# Patient Record
Sex: FEMALE | Race: WHITE | NOT HISPANIC OR LATINO | Employment: OTHER | URBAN - METROPOLITAN AREA
[De-identification: names, ages, dates, MRNs, and addresses within clinical notes are randomized per-mention and may not be internally consistent; named-entity substitution may affect disease eponyms.]

---

## 2017-01-01 ENCOUNTER — APPOINTMENT (EMERGENCY)
Dept: RADIOLOGY | Facility: HOSPITAL | Age: 82
End: 2017-01-01
Payer: MEDICARE

## 2017-01-01 ENCOUNTER — HOSPITAL ENCOUNTER (EMERGENCY)
Facility: HOSPITAL | Age: 82
Discharge: NURSING, REHAB OR LONG-TERM CARE | End: 2017-01-01
Attending: EMERGENCY MEDICINE | Admitting: EMERGENCY MEDICINE
Payer: MEDICARE

## 2017-01-01 VITALS
SYSTOLIC BLOOD PRESSURE: 179 MMHG | TEMPERATURE: 98.2 F | DIASTOLIC BLOOD PRESSURE: 74 MMHG | OXYGEN SATURATION: 98 % | HEART RATE: 53 BPM | RESPIRATION RATE: 16 BRPM

## 2017-01-01 DIAGNOSIS — T14.8XXA SKIN ABRASION: ICD-10-CM

## 2017-01-01 DIAGNOSIS — S80.12XA CONTUSION OF LEFT LEG, INITIAL ENCOUNTER: Primary | ICD-10-CM

## 2017-01-01 PROCEDURE — 90471 IMMUNIZATION ADMIN: CPT

## 2017-01-01 PROCEDURE — 99284 EMERGENCY DEPT VISIT MOD MDM: CPT

## 2017-01-01 PROCEDURE — 73590 X-RAY EXAM OF LOWER LEG: CPT

## 2017-01-01 PROCEDURE — 90715 TDAP VACCINE 7 YRS/> IM: CPT | Performed by: EMERGENCY MEDICINE

## 2017-01-01 RX ORDER — FERROUS SULFATE 325(65) MG
325 TABLET ORAL
COMMUNITY

## 2017-01-01 RX ORDER — ESOMEPRAZOLE MAGNESIUM 40 MG/1
40 CAPSULE, DELAYED RELEASE ORAL
COMMUNITY

## 2017-01-01 RX ORDER — ACETAMINOPHEN 325 MG/1
650 TABLET ORAL EVERY 6 HOURS PRN
COMMUNITY

## 2017-01-01 RX ORDER — VALSARTAN 320 MG/1
320 TABLET ORAL DAILY
COMMUNITY

## 2017-01-01 RX ORDER — LORATADINE 10 MG/1
10 TABLET ORAL DAILY
COMMUNITY

## 2017-01-01 RX ORDER — CALCIUM CARBONATE 750 MG/1
1 TABLET, CHEWABLE ORAL 4 TIMES DAILY PRN
COMMUNITY

## 2017-01-01 RX ORDER — ATORVASTATIN CALCIUM 20 MG/1
20 TABLET, FILM COATED ORAL
COMMUNITY

## 2017-01-01 RX ORDER — METOPROLOL TARTRATE 50 MG/1
50 TABLET, FILM COATED ORAL 2 TIMES DAILY
COMMUNITY

## 2017-01-01 RX ORDER — CLOPIDOGREL BISULFATE 75 MG/1
75 TABLET ORAL DAILY
COMMUNITY

## 2017-01-01 RX ORDER — SOLIFENACIN SUCCINATE 5 MG/1
5 TABLET, FILM COATED ORAL DAILY
COMMUNITY

## 2017-01-01 RX ORDER — DIAPER,BRIEF,INFANT-TODD,DISP
1 EACH MISCELLANEOUS 2 TIMES DAILY
COMMUNITY
End: 2017-09-07

## 2017-01-01 RX ORDER — AMLODIPINE BESYLATE 2.5 MG/1
2.5 TABLET ORAL DAILY
COMMUNITY

## 2017-01-01 RX ADMIN — TETANUS TOXOID, REDUCED DIPHTHERIA TOXOID AND ACELLULAR PERTUSSIS VACCINE, ADSORBED 0.5 ML: 5; 2.5; 8; 8; 2.5 SUSPENSION INTRAMUSCULAR at 17:08

## 2017-03-22 ENCOUNTER — HOSPITAL ENCOUNTER (EMERGENCY)
Facility: HOSPITAL | Age: 82
Discharge: HOME | End: 2017-03-22
Attending: EMERGENCY MEDICINE | Admitting: EMERGENCY MEDICINE
Payer: MEDICARE

## 2017-03-22 ENCOUNTER — APPOINTMENT (EMERGENCY)
Dept: RADIOLOGY | Facility: HOSPITAL | Age: 82
End: 2017-03-22
Payer: MEDICARE

## 2017-03-22 VITALS
SYSTOLIC BLOOD PRESSURE: 153 MMHG | TEMPERATURE: 97.8 F | HEART RATE: 65 BPM | OXYGEN SATURATION: 97 % | DIASTOLIC BLOOD PRESSURE: 85 MMHG | RESPIRATION RATE: 16 BRPM
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
## 2017-03-22 DIAGNOSIS — S80.02XA CONTUSION OF LEFT KNEE, INITIAL ENCOUNTER: ICD-10-CM

## 2017-03-22 DIAGNOSIS — S80.212A ABRASION OF LEFT KNEE, INITIAL ENCOUNTER: ICD-10-CM

## 2017-03-22 PROCEDURE — 73564 X-RAY EXAM KNEE 4 OR MORE: CPT

## 2017-03-22 PROCEDURE — 99284 EMERGENCY DEPT VISIT MOD MDM: CPT

## 2017-08-26 ENCOUNTER — APPOINTMENT (EMERGENCY)
Dept: CT IMAGING | Facility: HOSPITAL | Age: 82
End: 2017-08-26
Payer: MEDICARE

## 2017-08-26 ENCOUNTER — HOSPITAL ENCOUNTER (EMERGENCY)
Facility: HOSPITAL | Age: 82
Discharge: HOME | End: 2017-08-26
Attending: EMERGENCY MEDICINE | Admitting: EMERGENCY MEDICINE
Payer: MEDICARE

## 2017-08-26 VITALS
HEART RATE: 58 BPM | DIASTOLIC BLOOD PRESSURE: 70 MMHG | TEMPERATURE: 98.2 F | RESPIRATION RATE: 18 BRPM | OXYGEN SATURATION: 97 % | WEIGHT: 115.74 LBS | SYSTOLIC BLOOD PRESSURE: 156 MMHG
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
## 2017-08-26 DIAGNOSIS — N39.0 URINARY TRACT INFECTION, ACUTE: Primary | ICD-10-CM

## 2017-08-26 DIAGNOSIS — S00.03XA: ICD-10-CM

## 2017-08-26 LAB
BACTERIA UR QL AUTO: ABNORMAL /HPF
BILIRUB UR QL STRIP: NEGATIVE
CLARITY UR: ABNORMAL
CLARITY, POC: ABNORMAL
COLOR UR: YELLOW
COLOR, POC: YELLOW
GLUCOSE UR STRIP-MCNC: NEGATIVE MG/DL
HGB UR QL STRIP.AUTO: ABNORMAL
KETONES UR STRIP-MCNC: NEGATIVE MG/DL
LEUKOCYTE ESTERASE UR QL STRIP: ABNORMAL
NITRITE UR QL STRIP: POSITIVE
NON-SQ EPI CELLS URNS QL MICRO: ABNORMAL /HPF
PH UR STRIP.AUTO: 5.5 [PH] (ref 4.5–8)
PROT UR STRIP-MCNC: ABNORMAL MG/DL
RBC #/AREA URNS AUTO: ABNORMAL /HPF
SP GR UR STRIP.AUTO: 1.01 (ref 1–1.03)
UROBILINOGEN UR QL STRIP.AUTO: 0.2 E.U./DL
WBC #/AREA URNS AUTO: ABNORMAL /HPF

## 2017-08-26 PROCEDURE — 87186 SC STD MICRODIL/AGAR DIL: CPT

## 2017-08-26 PROCEDURE — 93005 ELECTROCARDIOGRAM TRACING: CPT

## 2017-08-26 PROCEDURE — 81002 URINALYSIS NONAUTO W/O SCOPE: CPT | Performed by: EMERGENCY MEDICINE

## 2017-08-26 PROCEDURE — 81001 URINALYSIS AUTO W/SCOPE: CPT

## 2017-08-26 PROCEDURE — 72125 CT NECK SPINE W/O DYE: CPT

## 2017-08-26 PROCEDURE — 87086 URINE CULTURE/COLONY COUNT: CPT

## 2017-08-26 PROCEDURE — 99284 EMERGENCY DEPT VISIT MOD MDM: CPT

## 2017-08-26 PROCEDURE — 70450 CT HEAD/BRAIN W/O DYE: CPT

## 2017-08-26 PROCEDURE — 87077 CULTURE AEROBIC IDENTIFY: CPT

## 2017-08-26 RX ORDER — CEPHALEXIN 250 MG/1
500 CAPSULE ORAL 2 TIMES DAILY
Qty: 28 CAPSULE | Refills: 0 | Status: SHIPPED | OUTPATIENT
Start: 2017-08-26 | End: 2017-09-02

## 2017-08-27 LAB
ATRIAL RATE: 58 BPM
P AXIS: 44 DEGREES
PR INTERVAL: 174 MS
QRS AXIS: -52 DEGREES
QRSD INTERVAL: 80 MS
QT INTERVAL: 416 MS
QTC INTERVAL: 408 MS
T WAVE AXIS: 59 DEGREES
VENTRICULAR RATE: 58 BPM

## 2017-08-28 LAB
BACTERIA UR CULT: NORMAL
BACTERIA UR CULT: NORMAL

## 2017-09-07 ENCOUNTER — APPOINTMENT (EMERGENCY)
Dept: CT IMAGING | Facility: HOSPITAL | Age: 82
End: 2017-09-07
Payer: MEDICARE

## 2017-09-07 ENCOUNTER — HOSPITAL ENCOUNTER (EMERGENCY)
Facility: HOSPITAL | Age: 82
Discharge: NURSING, REHAB OR LONG-TERM CARE | End: 2017-09-07
Attending: EMERGENCY MEDICINE | Admitting: EMERGENCY MEDICINE
Payer: MEDICARE

## 2017-09-07 VITALS
TEMPERATURE: 99.2 F | SYSTOLIC BLOOD PRESSURE: 162 MMHG | HEART RATE: 66 BPM | DIASTOLIC BLOOD PRESSURE: 71 MMHG | WEIGHT: 107.81 LBS | RESPIRATION RATE: 20 BRPM | OXYGEN SATURATION: 96 %

## 2017-09-07 DIAGNOSIS — N39.0 UTI (URINARY TRACT INFECTION): Primary | ICD-10-CM

## 2017-09-07 DIAGNOSIS — R53.1 GENERALIZED WEAKNESS: ICD-10-CM

## 2017-09-07 LAB
ALBUMIN SERPL BCP-MCNC: 3.3 G/DL (ref 3.5–5)
ALP SERPL-CCNC: 77 U/L (ref 46–116)
ALT SERPL W P-5'-P-CCNC: 13 U/L (ref 12–78)
ANION GAP SERPL CALCULATED.3IONS-SCNC: 6 MMOL/L (ref 4–13)
AST SERPL W P-5'-P-CCNC: 13 U/L (ref 5–45)
ATRIAL RATE: 60 BPM
BACTERIA UR QL AUTO: ABNORMAL /HPF
BASOPHILS # BLD AUTO: 0.02 THOUSANDS/ΜL (ref 0–0.1)
BASOPHILS NFR BLD AUTO: 0 % (ref 0–1)
BILIRUB SERPL-MCNC: 0.63 MG/DL (ref 0.2–1)
BILIRUB UR QL STRIP: NEGATIVE
BUN SERPL-MCNC: 27 MG/DL (ref 5–25)
CALCIUM SERPL-MCNC: 8.7 MG/DL (ref 8.3–10.1)
CHLORIDE SERPL-SCNC: 103 MMOL/L (ref 100–108)
CLARITY UR: ABNORMAL
CO2 SERPL-SCNC: 30 MMOL/L (ref 21–32)
COLOR UR: YELLOW
CREAT SERPL-MCNC: 1.76 MG/DL (ref 0.6–1.3)
EOSINOPHIL # BLD AUTO: 0.1 THOUSAND/ΜL (ref 0–0.61)
EOSINOPHIL NFR BLD AUTO: 1 % (ref 0–6)
ERYTHROCYTE [DISTWIDTH] IN BLOOD BY AUTOMATED COUNT: 14.9 % (ref 11.6–15.1)
GFR SERPL CREATININE-BSD FRML MDRD: 24 ML/MIN/1.73SQ M
GLUCOSE SERPL-MCNC: 132 MG/DL (ref 65–140)
GLUCOSE UR STRIP-MCNC: NEGATIVE MG/DL
HCT VFR BLD AUTO: 41.1 % (ref 34.8–46.1)
HGB BLD-MCNC: 13.5 G/DL (ref 11.5–15.4)
HGB UR QL STRIP.AUTO: ABNORMAL
KETONES UR STRIP-MCNC: NEGATIVE MG/DL
LEUKOCYTE ESTERASE UR QL STRIP: ABNORMAL
LYMPHOCYTES # BLD AUTO: 1.47 THOUSANDS/ΜL (ref 0.6–4.47)
LYMPHOCYTES NFR BLD AUTO: 13 % (ref 14–44)
MCH RBC QN AUTO: 31.8 PG (ref 26.8–34.3)
MCHC RBC AUTO-ENTMCNC: 32.8 G/DL (ref 31.4–37.4)
MCV RBC AUTO: 97 FL (ref 82–98)
MONOCYTES # BLD AUTO: 1.13 THOUSAND/ΜL (ref 0.17–1.22)
MONOCYTES NFR BLD AUTO: 10 % (ref 4–12)
NEUTROPHILS # BLD AUTO: 8.37 THOUSANDS/ΜL (ref 1.85–7.62)
NEUTS SEG NFR BLD AUTO: 76 % (ref 43–75)
NITRITE UR QL STRIP: POSITIVE
NON-SQ EPI CELLS URNS QL MICRO: ABNORMAL /HPF
NRBC BLD AUTO-RTO: 0 /100 WBCS
P AXIS: 66 DEGREES
PH UR STRIP.AUTO: 6 [PH] (ref 4.5–8)
PLATELET # BLD AUTO: 260 THOUSANDS/UL (ref 149–390)
PMV BLD AUTO: 10.4 FL (ref 8.9–12.7)
POTASSIUM SERPL-SCNC: 3.9 MMOL/L (ref 3.5–5.3)
PR INTERVAL: 170 MS
PROT SERPL-MCNC: 7.8 G/DL (ref 6.4–8.2)
PROT UR STRIP-MCNC: ABNORMAL MG/DL
QRS AXIS: -51 DEGREES
QRSD INTERVAL: 70 MS
QT INTERVAL: 404 MS
QTC INTERVAL: 404 MS
RBC # BLD AUTO: 4.24 MILLION/UL (ref 3.81–5.12)
RBC #/AREA URNS AUTO: ABNORMAL /HPF
SODIUM SERPL-SCNC: 139 MMOL/L (ref 136–145)
SP GR UR STRIP.AUTO: 1.01 (ref 1–1.03)
T WAVE AXIS: 59 DEGREES
TROPONIN I SERPL-MCNC: <0.02 NG/ML
TSH SERPL DL<=0.05 MIU/L-ACNC: 1.27 UIU/ML (ref 0.36–3.74)
UROBILINOGEN UR QL STRIP.AUTO: 0.2 E.U./DL
VENTRICULAR RATE: 60 BPM
WBC # BLD AUTO: 11.09 THOUSAND/UL (ref 4.31–10.16)
WBC #/AREA URNS AUTO: ABNORMAL /HPF

## 2017-09-07 PROCEDURE — 87086 URINE CULTURE/COLONY COUNT: CPT | Performed by: EMERGENCY MEDICINE

## 2017-09-07 PROCEDURE — 87186 SC STD MICRODIL/AGAR DIL: CPT | Performed by: EMERGENCY MEDICINE

## 2017-09-07 PROCEDURE — 87077 CULTURE AEROBIC IDENTIFY: CPT | Performed by: EMERGENCY MEDICINE

## 2017-09-07 PROCEDURE — 36415 COLL VENOUS BLD VENIPUNCTURE: CPT | Performed by: EMERGENCY MEDICINE

## 2017-09-07 PROCEDURE — 85025 COMPLETE CBC W/AUTO DIFF WBC: CPT | Performed by: EMERGENCY MEDICINE

## 2017-09-07 PROCEDURE — 84484 ASSAY OF TROPONIN QUANT: CPT | Performed by: EMERGENCY MEDICINE

## 2017-09-07 PROCEDURE — 80053 COMPREHEN METABOLIC PANEL: CPT | Performed by: EMERGENCY MEDICINE

## 2017-09-07 PROCEDURE — 81001 URINALYSIS AUTO W/SCOPE: CPT | Performed by: EMERGENCY MEDICINE

## 2017-09-07 PROCEDURE — 93005 ELECTROCARDIOGRAM TRACING: CPT | Performed by: EMERGENCY MEDICINE

## 2017-09-07 PROCEDURE — 70450 CT HEAD/BRAIN W/O DYE: CPT

## 2017-09-07 PROCEDURE — 84443 ASSAY THYROID STIM HORMONE: CPT | Performed by: EMERGENCY MEDICINE

## 2017-09-07 PROCEDURE — 96360 HYDRATION IV INFUSION INIT: CPT

## 2017-09-07 PROCEDURE — 99285 EMERGENCY DEPT VISIT HI MDM: CPT

## 2017-09-07 PROCEDURE — 96361 HYDRATE IV INFUSION ADD-ON: CPT

## 2017-09-07 RX ORDER — TRAMADOL HYDROCHLORIDE 50 MG/1
50 TABLET ORAL EVERY 6 HOURS PRN
COMMUNITY

## 2017-09-07 RX ORDER — NITROFURANTOIN 25; 75 MG/1; MG/1
100 CAPSULE ORAL 2 TIMES DAILY
Qty: 14 CAPSULE | Refills: 0 | Status: SHIPPED | OUTPATIENT
Start: 2017-09-07 | End: 2017-09-14

## 2017-09-07 RX ORDER — NITROFURANTOIN 25; 75 MG/1; MG/1
100 CAPSULE ORAL ONCE
Status: COMPLETED | OUTPATIENT
Start: 2017-09-07 | End: 2017-09-07

## 2017-09-07 RX ORDER — LOPERAMIDE HYDROCHLORIDE 2 MG/1
2 CAPSULE ORAL AS NEEDED
COMMUNITY

## 2017-09-07 RX ORDER — FUROSEMIDE 20 MG/1
20 TABLET ORAL DAILY
COMMUNITY

## 2017-09-07 RX ORDER — MELOXICAM 7.5 MG/1
7.5 TABLET ORAL 2 TIMES DAILY PRN
COMMUNITY

## 2017-09-07 RX ADMIN — SODIUM CHLORIDE 500 ML: 0.9 INJECTION, SOLUTION INTRAVENOUS at 17:15

## 2017-09-07 RX ADMIN — NITROFURANTOIN MONOHYDRATE/MACROCRYSTALLINE 100 MG: 25; 75 CAPSULE ORAL at 20:58

## 2017-09-10 LAB
BACTERIA UR CULT: NORMAL

## 2017-10-04 ENCOUNTER — HOSPITAL ENCOUNTER (INPATIENT)
Facility: HOSPITAL | Age: 82
LOS: 9 days | Discharge: HOSPICE | DRG: 388 | End: 2017-10-14
Attending: EMERGENCY MEDICINE | Admitting: SURGERY
Payer: MEDICARE

## 2017-10-04 ENCOUNTER — APPOINTMENT (EMERGENCY)
Dept: CT IMAGING | Facility: HOSPITAL | Age: 82
DRG: 388 | End: 2017-10-04
Payer: MEDICARE

## 2017-10-04 DIAGNOSIS — I10 HYPERTENSION: ICD-10-CM

## 2017-10-04 DIAGNOSIS — N18.9 CKD (CHRONIC KIDNEY DISEASE): ICD-10-CM

## 2017-10-04 DIAGNOSIS — I10 HYPERTENSION, UNSPECIFIED TYPE: ICD-10-CM

## 2017-10-04 DIAGNOSIS — K56.609 INTESTINAL OBSTRUCTION, UNSPECIFIED CAUSE, UNSPECIFIED WHETHER PARTIAL OR COMPLETE (HCC): Primary | ICD-10-CM

## 2017-10-04 DIAGNOSIS — K56.609 SMALL BOWEL OBSTRUCTION (HCC): ICD-10-CM

## 2017-10-04 DIAGNOSIS — N18.9 CHRONIC KIDNEY DISEASE, UNSPECIFIED CKD STAGE: ICD-10-CM

## 2017-10-04 PROBLEM — Z79.02 PLATELET INHIBITION DUE TO PLAVIX: Status: ACTIVE | Noted: 2017-10-04

## 2017-10-04 PROBLEM — N18.2 STAGE 2 CHRONIC KIDNEY DISEASE: Status: ACTIVE | Noted: 2017-10-04

## 2017-10-04 PROBLEM — K56.690 OTHER PARTIAL INTESTINAL OBSTRUCTION (HCC): Status: ACTIVE | Noted: 2017-10-04

## 2017-10-04 LAB
ALBUMIN SERPL BCP-MCNC: 3.1 G/DL (ref 3.5–5)
ALP SERPL-CCNC: 68 U/L (ref 46–116)
ALT SERPL W P-5'-P-CCNC: 13 U/L (ref 12–78)
ANION GAP SERPL CALCULATED.3IONS-SCNC: 10 MMOL/L (ref 4–13)
ANISOCYTOSIS BLD QL SMEAR: PRESENT
AST SERPL W P-5'-P-CCNC: 19 U/L (ref 5–45)
BASOPHILS # BLD MANUAL: 0 THOUSAND/UL (ref 0–0.1)
BASOPHILS NFR MAR MANUAL: 0 % (ref 0–1)
BILIRUB SERPL-MCNC: 0.5 MG/DL (ref 0.2–1)
BUN SERPL-MCNC: 28 MG/DL (ref 5–25)
CALCIUM SERPL-MCNC: 9.4 MG/DL (ref 8.3–10.1)
CHLORIDE SERPL-SCNC: 103 MMOL/L (ref 100–108)
CO2 SERPL-SCNC: 29 MMOL/L (ref 21–32)
CREAT SERPL-MCNC: 1.59 MG/DL (ref 0.6–1.3)
DOHLE BOD BLD QL SMEAR: PRESENT
EOSINOPHIL # BLD MANUAL: 0 THOUSAND/UL (ref 0–0.4)
EOSINOPHIL NFR BLD MANUAL: 0 % (ref 0–6)
ERYTHROCYTE [DISTWIDTH] IN BLOOD BY AUTOMATED COUNT: 14.9 % (ref 11.6–15.1)
GFR SERPL CREATININE-BSD FRML MDRD: 27 ML/MIN/1.73SQ M
GLUCOSE SERPL-MCNC: 153 MG/DL (ref 65–140)
HCT VFR BLD AUTO: 43.1 % (ref 34.8–46.1)
HGB BLD-MCNC: 13.9 G/DL (ref 11.5–15.4)
LACTATE SERPL-SCNC: 1.7 MMOL/L (ref 0.5–2)
LIPASE SERPL-CCNC: 41 U/L (ref 73–393)
LYMPHOCYTES # BLD AUTO: 1.15 THOUSAND/UL (ref 0.6–4.47)
LYMPHOCYTES # BLD AUTO: 21 % (ref 14–44)
MCH RBC QN AUTO: 31 PG (ref 26.8–34.3)
MCHC RBC AUTO-ENTMCNC: 32.3 G/DL (ref 31.4–37.4)
MCV RBC AUTO: 96 FL (ref 82–98)
METAMYELOCYTES NFR BLD MANUAL: 3 % (ref 0–1)
MONOCYTES # BLD AUTO: 0.33 THOUSAND/UL (ref 0–1.22)
MONOCYTES NFR BLD: 6 % (ref 4–12)
NEUTROPHILS # BLD MANUAL: 3.84 THOUSAND/UL (ref 1.85–7.62)
NEUTS BAND NFR BLD MANUAL: 47 % (ref 0–8)
NEUTS SEG NFR BLD AUTO: 23 % (ref 43–75)
NRBC BLD AUTO-RTO: 0 /100 WBCS
PLATELET # BLD AUTO: 246 THOUSANDS/UL (ref 149–390)
PLATELET BLD QL SMEAR: ADEQUATE
PMV BLD AUTO: 10.2 FL (ref 8.9–12.7)
POTASSIUM SERPL-SCNC: 4.3 MMOL/L (ref 3.5–5.3)
PROT SERPL-MCNC: 7.7 G/DL (ref 6.4–8.2)
RBC # BLD AUTO: 4.48 MILLION/UL (ref 3.81–5.12)
SODIUM SERPL-SCNC: 142 MMOL/L (ref 136–145)
TOTAL CELLS COUNTED SPEC: 100
WBC # BLD AUTO: 5.48 THOUSAND/UL (ref 4.31–10.16)
WBC TOXIC VACUOLES BLD QL SMEAR: PRESENT

## 2017-10-04 PROCEDURE — 80053 COMPREHEN METABOLIC PANEL: CPT | Performed by: EMERGENCY MEDICINE

## 2017-10-04 PROCEDURE — 36415 COLL VENOUS BLD VENIPUNCTURE: CPT

## 2017-10-04 PROCEDURE — 99285 EMERGENCY DEPT VISIT HI MDM: CPT

## 2017-10-04 PROCEDURE — 83605 ASSAY OF LACTIC ACID: CPT | Performed by: EMERGENCY MEDICINE

## 2017-10-04 PROCEDURE — 85027 COMPLETE CBC AUTOMATED: CPT | Performed by: EMERGENCY MEDICINE

## 2017-10-04 PROCEDURE — 96361 HYDRATE IV INFUSION ADD-ON: CPT

## 2017-10-04 PROCEDURE — 96376 TX/PRO/DX INJ SAME DRUG ADON: CPT

## 2017-10-04 PROCEDURE — 83690 ASSAY OF LIPASE: CPT | Performed by: EMERGENCY MEDICINE

## 2017-10-04 PROCEDURE — 96374 THER/PROPH/DIAG INJ IV PUSH: CPT

## 2017-10-04 PROCEDURE — 74176 CT ABD & PELVIS W/O CONTRAST: CPT

## 2017-10-04 PROCEDURE — 85007 BL SMEAR W/DIFF WBC COUNT: CPT | Performed by: EMERGENCY MEDICINE

## 2017-10-04 RX ORDER — PANTOPRAZOLE SODIUM 40 MG/1
40 INJECTION, POWDER, FOR SOLUTION INTRAVENOUS DAILY
Status: DISCONTINUED | OUTPATIENT
Start: 2017-10-05 | End: 2017-10-11

## 2017-10-04 RX ORDER — ONDANSETRON 2 MG/ML
4 INJECTION INTRAMUSCULAR; INTRAVENOUS ONCE
Status: COMPLETED | OUTPATIENT
Start: 2017-10-04 | End: 2017-10-04

## 2017-10-04 RX ORDER — MORPHINE SULFATE 2 MG/ML
2 INJECTION, SOLUTION INTRAMUSCULAR; INTRAVENOUS EVERY 2 HOUR PRN
Status: DISCONTINUED | OUTPATIENT
Start: 2017-10-04 | End: 2017-10-13

## 2017-10-04 RX ORDER — DIAPER,BRIEF,INFANT-TODD,DISP
1 EACH MISCELLANEOUS EVERY 6 HOURS PRN
COMMUNITY

## 2017-10-04 RX ORDER — ONDANSETRON 2 MG/ML
4 INJECTION INTRAMUSCULAR; INTRAVENOUS EVERY 4 HOURS PRN
Status: DISCONTINUED | OUTPATIENT
Start: 2017-10-04 | End: 2017-10-14 | Stop reason: HOSPADM

## 2017-10-04 RX ORDER — ONDANSETRON 2 MG/ML
INJECTION INTRAMUSCULAR; INTRAVENOUS
Status: COMPLETED
Start: 2017-10-04 | End: 2017-10-04

## 2017-10-04 RX ORDER — HEPARIN SODIUM 5000 [USP'U]/ML
5000 INJECTION, SOLUTION INTRAVENOUS; SUBCUTANEOUS EVERY 8 HOURS SCHEDULED
Status: DISCONTINUED | OUTPATIENT
Start: 2017-10-05 | End: 2017-10-13

## 2017-10-04 RX ORDER — HYDROMORPHONE HCL 110MG/55ML
1 PATIENT CONTROLLED ANALGESIA SYRINGE INTRAVENOUS
Status: DISCONTINUED | OUTPATIENT
Start: 2017-10-04 | End: 2017-10-06

## 2017-10-04 RX ORDER — GUAIFENESIN 100 MG/5ML
SYRUP ORAL EVERY 4 HOURS PRN
COMMUNITY

## 2017-10-04 RX ORDER — METOPROLOL TARTRATE 50 MG/1
50 TABLET, FILM COATED ORAL 2 TIMES DAILY
Status: DISCONTINUED | OUTPATIENT
Start: 2017-10-04 | End: 2017-10-05

## 2017-10-04 RX ORDER — DEXTROSE AND SODIUM CHLORIDE 5; .45 G/100ML; G/100ML
125 INJECTION, SOLUTION INTRAVENOUS CONTINUOUS
Status: DISCONTINUED | OUTPATIENT
Start: 2017-10-04 | End: 2017-10-05

## 2017-10-04 RX ORDER — HYDRALAZINE HYDROCHLORIDE 20 MG/ML
10 INJECTION INTRAMUSCULAR; INTRAVENOUS ONCE
Status: DISCONTINUED | OUTPATIENT
Start: 2017-10-04 | End: 2017-10-13

## 2017-10-04 RX ADMIN — MORPHINE SULFATE 2 MG: 2 INJECTION, SOLUTION INTRAMUSCULAR; INTRAVENOUS at 20:44

## 2017-10-04 RX ADMIN — ONDANSETRON 4 MG: 2 INJECTION INTRAMUSCULAR; INTRAVENOUS at 16:42

## 2017-10-04 RX ADMIN — ONDANSETRON 4 MG: 2 INJECTION INTRAMUSCULAR; INTRAVENOUS at 18:06

## 2017-10-04 RX ADMIN — METOPROLOL TARTRATE 50 MG: 50 TABLET ORAL at 22:49

## 2017-10-04 RX ADMIN — SODIUM CHLORIDE 1000 ML: 0.9 INJECTION, SOLUTION INTRAVENOUS at 19:15

## 2017-10-04 RX ADMIN — DEXTROSE AND SODIUM CHLORIDE 125 ML/HR: 5; .45 INJECTION, SOLUTION INTRAVENOUS at 20:53

## 2017-10-04 NOTE — ED NOTES
Pt  Unable to drink PO contrast due to choking, coughing, and vomiting  Dr Marquis Bright made aware, states do not give the PO contrast  CT made aware       Kisha Stanley RN  10/04/17 7535

## 2017-10-04 NOTE — TREATMENT PLAN
80year old lady with Nausea and vomiting and psbo on CT scan  Spoke with Dr Jerri Griffin, pt with non toxic abdomen but PSBO on CT scan  On Plavix, unknown why  HTN and prior stroke in past  Chronic kidney disease, making urine in the ER tonight  LA 1 7 , CBC okay with bandemia  Per Dr Jerri Griffin, abdomen is non peritoneal    Will admit for iv hydration, NGT placement (advised ER) and monitor carefully  Will ask medicine to see for HTN tonight  Has a POLST< with CPR okay, not intubation per ER doc       Signature:   Pawan Kirkland MD  Date: 10/4/2017 Time: 7:50 PM

## 2017-10-04 NOTE — ED PROVIDER NOTES
History  Chief Complaint   Patient presents with    Abdominal Pain     per ems, patient has been experiencing n/v and abdominal pain for a few days  patient started experiencing diarrhea yesterday, patient vomiting upon arrival         History provided by:  Patient  Abdominal Pain   Pain location: upper abdominal pain  Pain quality: aching    Pain radiates to:  Does not radiate  Pain severity:  Moderate  Onset quality:  Gradual  Duration:  2 days  Timing:  Constant  Chronicity:  New  Relieved by:  Nothing  Worsened by:  Nothing  Associated symptoms: belching, chills, diarrhea, flatus, nausea and vomiting    Associated symptoms: no anorexia, no chest pain, no constipation, no dysuria, no fatigue, no fever, no hematemesis, no hematochezia, no hematuria, no melena, no shortness of breath and no sore throat    Diarrhea:     Quality:  Semi-solid  Nausea:     Severity:  Mild    Onset quality:  Gradual    Duration:  2 days  Vomiting:     Quality:  Bilious material    Duration:  2 days      Prior to Admission Medications   Prescriptions Last Dose Informant Patient Reported?  Taking?   acetaminophen (TYLENOL) 325 mg tablet   Yes Yes   Sig: Take 650 mg by mouth every 6 (six) hours as needed for mild pain   amLODIPine (NORVASC) 2 5 mg tablet   Yes Yes   Sig: Take 2 5 mg by mouth daily   atorvastatin (LIPITOR) 20 mg tablet   Yes Yes   Sig: Take 20 mg by mouth daily at bedtime     calcium carbonate (TUMS EX) 750 mg chewable tablet   Yes Yes   Sig: Chew 1 tablet 4 (four) times a day as needed for indigestion or heartburn   carbamide peroxide (DEBROX) 6 5 % otic solution   Yes Yes   Sig: Administer 4 drops to the right ear daily as needed for ear pain   clopidogrel (PLAVIX) 75 mg tablet   Yes Yes   Sig: Take 75 mg by mouth daily   esomeprazole (NexIUM) 40 MG capsule   Yes Yes   Sig: Take 40 mg by mouth every morning before breakfast   ferrous sulfate 325 (65 Fe) mg tablet   Yes Yes   Sig: Take 325 mg by mouth Twice per week; sat & tues   furosemide (LASIX) 20 mg tablet   Yes Yes   Sig: Take 20 mg by mouth daily   guaiFENesin (ROBITUSSIN) 100 mg/5 mL syrup   Yes Yes   Sig: Take by mouth every 4 (four) hours as needed for cough   hydrocortisone 1 % cream   Yes Yes   Sig: Apply 1 application topically every 6 (six) hours as needed   loperamide (IMODIUM) 2 mg capsule   Yes Yes   Sig: Take 2 mg by mouth as needed for diarrhea   loratadine (CLARITIN) 10 mg tablet   Yes Yes   Sig: Take 10 mg by mouth daily   meloxicam (MOBIC) 7 5 mg tablet   Yes Yes   Sig: Take 7 5 mg by mouth 2 (two) times a day as needed   metoprolol tartrate (LOPRESSOR) 50 mg tablet   Yes Yes   Sig: Take 50 mg by mouth 2 (two) times a day   solifenacin (VESICARE) 5 mg tablet   Yes Yes   Sig: Take 5 mg by mouth daily     timolol (BETIMOL) 0 5 % ophthalmic solution   Yes Yes   Sig: Administer 1 drop to both eyes daily   traMADol (ULTRAM) 50 mg tablet   Yes Yes   Sig: Take 50 mg by mouth every 6 (six) hours as needed for moderate pain   valsartan (DIOVAN) 320 MG tablet   Yes Yes   Sig: Take 320 mg by mouth daily      Facility-Administered Medications: None       Past Medical History:   Diagnosis Date    breast     GERD (gastroesophageal reflux disease)     Glaucoma     Gout     Hyperlipidemia     Hypertension     Osteoarthritis     Stroke [facility])        Past Surgical History:   Procedure Laterality Date    ANKLE SURGERY         History reviewed  No pertinent family history  I have reviewed and agree with the history as documented  Social History   Substance Use Topics    Smoking status: Never Smoker    Smokeless tobacco: Never Used    Alcohol use Yes      Comment: rarely         Review of Systems   Constitutional: Positive for chills  Negative for activity change, appetite change, fatigue and fever  HENT: Negative for congestion, dental problem, ear pain, rhinorrhea and sore throat  Eyes: Negative for pain and redness     Respiratory: Negative for chest tightness, shortness of breath and wheezing  Cardiovascular: Negative for chest pain and palpitations  Gastrointestinal: Positive for abdominal pain, diarrhea, flatus, nausea and vomiting  Negative for anorexia, blood in stool, constipation, hematemesis, hematochezia and melena  Endocrine: Negative for cold intolerance and heat intolerance  Genitourinary: Negative for dysuria, frequency and hematuria  Musculoskeletal: Negative for arthralgias and myalgias  Skin: Negative for color change, pallor and rash  Neurological: Negative for weakness and numbness  Hematological: Does not bruise/bleed easily  Psychiatric/Behavioral: Negative for agitation, hallucinations and suicidal ideas  Physical Exam  ED Triage Vitals   Temperature Pulse Respirations Blood Pressure SpO2   10/04/17 1639 10/04/17 1636 10/04/17 1636 10/04/17 1636 10/04/17 1636   98 8 °F (37 1 °C) 80 18 (!) 197/84 91 %      Temp Source Heart Rate Source Patient Position - Orthostatic VS BP Location FiO2 (%)   10/04/17 1639 10/04/17 1636 10/04/17 1636 10/04/17 1636 --   Oral Monitor Sitting Left arm       Pain Score       10/04/17 1638       6           Physical Exam   Constitutional: She is oriented to person, place, and time  She appears well-developed and well-nourished  HENT:   Mouth/Throat: No oropharyngeal exudate  TMs normal bilaterally no pharyngeal erythema no rhinorrhea nontender palpation of sinuses, normal looking turbinates   Eyes: Conjunctivae and EOM are normal    Neck: Normal range of motion  Neck supple  No meningeal signs   Cardiovascular: Normal rate, regular rhythm, normal heart sounds and intact distal pulses  Pulmonary/Chest: Effort normal and breath sounds normal  No respiratory distress  She has no wheezes  She has no rales  She exhibits no tenderness  Abdominal: Soft  Bowel sounds are normal  She exhibits no distension and no mass  There is no tenderness  No hernia     No cvat   Musculoskeletal: Normal range of motion  She exhibits no edema  Lymphadenopathy:     She has no cervical adenopathy  Neurological: She is alert and oriented to person, place, and time  No cranial nerve deficit  Skin: No rash noted  No erythema  No edema   Psychiatric: She has a normal mood and affect  Her behavior is normal    Nursing note and vitals reviewed  ED Medications  Medications   sodium chloride 0 9 % bolus 1,000 mL (1,000 mL Intravenous New Bag 10/4/17 1915)   dextrose 5 % and sodium chloride 0 45 % infusion (not administered)   HYDROmorphone (DILAUDID) 1 mg/mL injection 1 mg (not administered)   heparin (porcine) subcutaneous injection 5,000 Units (not administered)   morphine injection 2 mg (not administered)   pantoprazole (PROTONIX) injection 40 mg (not administered)   ondansetron (ZOFRAN) injection 4 mg (not administered)   hydrALAZINE (APRESOLINE) injection 10 mg (not administered)   ondansetron (ZOFRAN) injection 4 mg (4 mg Intravenous Given 10/4/17 1642)   ondansetron (ZOFRAN) injection 4 mg (4 mg Intravenous Given 10/4/17 1806)       Diagnostic Studies  Labs Reviewed   COMPREHENSIVE METABOLIC PANEL - Abnormal        Result Value Ref Range Status    BUN 28 (*) 5 - 25 mg/dL Final    Creatinine 1 59 (*) 0 60 - 1 30 mg/dL Final    Comment: Standardized to IDMS reference method    Glucose 153 (*) 65 - 140 mg/dL Final    Comment:   If the patient is fasting, the ADA then defines impaired fasting glucose as > 100 mg/dL and diabetes as > or equal to 123 mg/dL  Specimen collection should occur prior to Sulfasalazine administration due to the potential for falsely depressed results  Specimen collection should occur prior to Sulfapyridine administration due to the potential for falsely elevated results      Albumin 3 1 (*) 3 5 - 5 0 g/dL Final    Sodium 142  136 - 145 mmol/L Final    Potassium 4 3  3 5 - 5 3 mmol/L Final    Chloride 103  100 - 108 mmol/L Final    CO2 29  21 - 32 mmol/L Final    Anion Gap 10  4 - 13 mmol/L Final    Calcium 9 4  8 3 - 10 1 mg/dL Final    AST 19  5 - 45 U/L Final    Comment:   Specimen collection should occur prior to Sulfasalazine administration due to the potential for falsely depressed results  ALT 13  12 - 78 U/L Final    Comment:   Specimen collection should occur prior to Sulfasalazine administration due to the potential for falsely depressed results  Alkaline Phosphatase 68  46 - 116 U/L Final    Total Protein 7 7  6 4 - 8 2 g/dL Final    Total Bilirubin 0 50  0 20 - 1 00 mg/dL Final    eGFR 27  ml/min/1 73sq m Final    Narrative:     National Kidney Disease Education Program recommendations are as follows:  GFR calculation is accurate only with a steady state creatinine  Chronic Kidney disease less than 60 ml/min/1 73 sq  meters  Kidney failure less than 15 ml/min/1 73 sq  meters     LIPASE - Abnormal     Lipase 41 (*) 73 - 393 u/L Final   MANUAL DIFFERENTIAL(PHLEBS DO NOT ORDER) - Abnormal     Segmented % 23 (*) 43 - 75 % Final    Bands % 47 (*) 0 - 8 % Final    Metamyelocytes% 3 (*) 0 - 1 % Final    Lymphocytes % 21  14 - 44 % Final    Monocytes % 6  4 - 12 % Final    Eosinophils % 0  0 - 6 % Final    Basophils % 0  0 - 1 % Final    Absolute Neutrophils 3 84  1 85 - 7 62 Thousand/uL Final    Lymphocytes Absolute 1 15  0 60 - 4 47 Thousand/uL Final    Monocytes Absolute 0 33  0 00 - 1 22 Thousand/uL Final    Eosinophils Absolute 0 00  0 00 - 0 40 Thousand/uL Final    Basophils Absolute 0 00  0 00 - 0 10 Thousand/uL Final    Total Counted 100   Final    Dohle Bodies Present   Final    Toxic Vacuolated Neutrophils Present   Final    Anisocytosis Present   Final    Platelet Estimate Adequate  Adequate Final   CBC AND DIFFERENTIAL - Normal    WBC 5 48  4 31 - 10 16 Thousand/uL Final    RBC 4 48  3 81 - 5 12 Million/uL Final    Hemoglobin 13 9  11 5 - 15 4 g/dL Final    Hematocrit 43 1  34 8 - 46 1 % Final    MCV 96  82 - 98 fL Final    MCH 31 0  26 8 - 34 3 pg Final    MCHC 32 3  31 4 - 37 4 g/dL Final    RDW 14 9  11 6 - 15 1 % Final    MPV 10 2  8 9 - 12 7 fL Final    Platelets 546  266 - 390 Thousands/uL Final    nRBC 0  /100 WBCs Final    Comment: This is an appended report  These results have been appended to a previously preliminary verified report  Narrative: This is an appended report  These results have been appended to a previously verified report  LACTIC ACID, PLASMA - Normal    LACTIC ACID 1 7  0 5 - 2 0 mmol/L Final    Narrative:     Result may be elevated if tourniquet was used during collection  CT abdomen pelvis wo contrast   ED Interpretation   1   Distended stomach with multiple dilated small bowel loops     Transition point is not definitely identified but probably in   the pelvic region   Findings suspicious for small bowel   obstruction  2   Mild consolidation noted in the right lower lobe anteriorly   and lingula   Large hiatal hernia noted, increased in size from   the prior exam   Trace right pleural effusion  Final Result      1  Distended stomach with multiple dilated small bowel loops  Transition point is not definitely identified but probably in the pelvic region  Findings suspicious for small bowel obstruction  2   Mild consolidation noted in the right lower lobe anteriorly and lingula  Large hiatal hernia noted, increased in size from the prior exam   Trace right pleural effusion  ##cfslh   I personally discussed the critical result with Luz Elena Neely on 10/4/2017 6:34PM    ##         Workstation performed: [id number]         XR abdomen obstruction series    (Results Pending)       Procedures  Procedures      Phone Contacts  ED Phone Contact    ED Course  ED Course as of Oct 04 1959   Wed Oct 04, 2017   1713 Creatinine: (!) 1 59   1834 Case d/w oncall radiologist   Pt has SBO  Will d/w patients POA  1849 Case discussed with patient's power of     She is unwilling to make a decision at this time as to whether she is progressing with medical/surgical treatment/palliative care  She states that IV fluids are okay and will be ordered at this time    1935 On call general surgeon paged    1946 Case d/w Dr Zeferino Mesa will place ng tube, admit  MDM  Number of Diagnoses or Management Options  Diagnosis management comments: abd pain n/v/d-will check abd labs  Pts polst states she does not wish to have ivfs  Pt states she is agreeable to ct scan and labs  Will do labs, ct, d/w POA  CritCare Time    Disposition  Final diagnoses:   Small bowel obstruction   CKD (chronic kidney disease)   Hypertension     ED Disposition     None      Follow-up Information    None       Patient's Medications   Discharge Prescriptions    No medications on file     No discharge procedures on file      ED Provider  Electronically Signed by       Army Young MD  10/04/17 2703

## 2017-10-05 ENCOUNTER — APPOINTMENT (INPATIENT)
Dept: CT IMAGING | Facility: HOSPITAL | Age: 82
DRG: 388 | End: 2017-10-05
Payer: MEDICARE

## 2017-10-05 ENCOUNTER — APPOINTMENT (INPATIENT)
Dept: RADIOLOGY | Facility: HOSPITAL | Age: 82
DRG: 388 | End: 2017-10-05
Payer: MEDICARE

## 2017-10-05 ENCOUNTER — APPOINTMENT (OUTPATIENT)
Dept: RADIOLOGY | Facility: HOSPITAL | Age: 82
DRG: 388 | End: 2017-10-05
Payer: MEDICARE

## 2017-10-05 PROBLEM — N28.9 RENAL INSUFFICIENCY: Status: ACTIVE | Noted: 2017-10-05

## 2017-10-05 LAB
ALBUMIN SERPL BCP-MCNC: 2.4 G/DL (ref 3.5–5)
ALP SERPL-CCNC: 47 U/L (ref 46–116)
ALT SERPL W P-5'-P-CCNC: 9 U/L (ref 12–78)
ANION GAP SERPL CALCULATED.3IONS-SCNC: 8 MMOL/L (ref 4–13)
AST SERPL W P-5'-P-CCNC: 12 U/L (ref 5–45)
BASOPHILS # BLD MANUAL: 0 THOUSAND/UL (ref 0–0.1)
BASOPHILS NFR MAR MANUAL: 0 % (ref 0–1)
BILIRUB SERPL-MCNC: 0.35 MG/DL (ref 0.2–1)
BUN SERPL-MCNC: 29 MG/DL (ref 5–25)
CALCIUM SERPL-MCNC: 8.2 MG/DL (ref 8.3–10.1)
CHLORIDE SERPL-SCNC: 105 MMOL/L (ref 100–108)
CO2 SERPL-SCNC: 27 MMOL/L (ref 21–32)
CREAT SERPL-MCNC: 1.46 MG/DL (ref 0.6–1.3)
DOHLE BOD BLD QL SMEAR: PRESENT
EOSINOPHIL # BLD MANUAL: 0 THOUSAND/UL (ref 0–0.4)
EOSINOPHIL NFR BLD MANUAL: 0 % (ref 0–6)
ERYTHROCYTE [DISTWIDTH] IN BLOOD BY AUTOMATED COUNT: 14.9 % (ref 11.6–15.1)
GFR SERPL CREATININE-BSD FRML MDRD: 30 ML/MIN/1.73SQ M
GLUCOSE SERPL-MCNC: 153 MG/DL (ref 65–140)
HCT VFR BLD AUTO: 34.9 % (ref 34.8–46.1)
HGB BLD-MCNC: 11 G/DL (ref 11.5–15.4)
LACTATE SERPL-SCNC: 1.8 MMOL/L (ref 0.5–2)
LG PLATELETS BLD QL SMEAR: PRESENT
LYMPHOCYTES # BLD AUTO: 1.21 THOUSAND/UL (ref 0.6–4.47)
LYMPHOCYTES # BLD AUTO: 15 % (ref 14–44)
MCH RBC QN AUTO: 30.4 PG (ref 26.8–34.3)
MCHC RBC AUTO-ENTMCNC: 31.5 G/DL (ref 31.4–37.4)
MCV RBC AUTO: 96 FL (ref 82–98)
METAMYELOCYTES NFR BLD MANUAL: 3 % (ref 0–1)
MONOCYTES # BLD AUTO: 0.4 THOUSAND/UL (ref 0–1.22)
MONOCYTES NFR BLD: 5 % (ref 4–12)
MYELOCYTES NFR BLD MANUAL: 1 % (ref 0–1)
NEUTROPHILS # BLD MANUAL: 6.12 THOUSAND/UL (ref 1.85–7.62)
NEUTS BAND NFR BLD MANUAL: 52 % (ref 0–8)
NEUTS SEG NFR BLD AUTO: 24 % (ref 43–75)
NRBC BLD AUTO-RTO: 0 /100 WBCS
PLATELET # BLD AUTO: 213 THOUSANDS/UL (ref 149–390)
PLATELET BLD QL SMEAR: ADEQUATE
PMV BLD AUTO: 10.5 FL (ref 8.9–12.7)
POTASSIUM SERPL-SCNC: 3.4 MMOL/L (ref 3.5–5.3)
PROT SERPL-MCNC: 6 G/DL (ref 6.4–8.2)
RBC # BLD AUTO: 3.62 MILLION/UL (ref 3.81–5.12)
RBC MORPH BLD: NORMAL
SODIUM SERPL-SCNC: 140 MMOL/L (ref 136–145)
TOTAL CELLS COUNTED SPEC: 100
WBC # BLD AUTO: 8.05 THOUSAND/UL (ref 4.31–10.16)

## 2017-10-05 PROCEDURE — 85027 COMPLETE CBC AUTOMATED: CPT | Performed by: SURGERY

## 2017-10-05 PROCEDURE — 83605 ASSAY OF LACTIC ACID: CPT | Performed by: FAMILY MEDICINE

## 2017-10-05 PROCEDURE — C9113 INJ PANTOPRAZOLE SODIUM, VIA: HCPCS | Performed by: SURGERY

## 2017-10-05 PROCEDURE — 71020 HB CHEST X-RAY 2VW FRONTAL&LATL: CPT

## 2017-10-05 PROCEDURE — 85007 BL SMEAR W/DIFF WBC COUNT: CPT | Performed by: SURGERY

## 2017-10-05 PROCEDURE — 93005 ELECTROCARDIOGRAM TRACING: CPT | Performed by: INTERNAL MEDICINE

## 2017-10-05 PROCEDURE — 74176 CT ABD & PELVIS W/O CONTRAST: CPT

## 2017-10-05 PROCEDURE — 71250 CT THORAX DX C-: CPT

## 2017-10-05 PROCEDURE — 80053 COMPREHEN METABOLIC PANEL: CPT | Performed by: SURGERY

## 2017-10-05 PROCEDURE — 93005 ELECTROCARDIOGRAM TRACING: CPT

## 2017-10-05 RX ORDER — POTASSIUM CHLORIDE, DEXTROSE MONOHYDRATE AND SODIUM CHLORIDE 300; 5; 900 MG/100ML; G/100ML; MG/100ML
50 INJECTION, SOLUTION INTRAVENOUS CONTINUOUS
Status: DISCONTINUED | OUTPATIENT
Start: 2017-10-05 | End: 2017-10-09

## 2017-10-05 RX ORDER — METOPROLOL TARTRATE 5 MG/5ML
2.5 INJECTION INTRAVENOUS EVERY 8 HOURS
Status: DISCONTINUED | OUTPATIENT
Start: 2017-10-05 | End: 2017-10-10

## 2017-10-05 RX ORDER — HYDRALAZINE HYDROCHLORIDE 20 MG/ML
5 INJECTION INTRAMUSCULAR; INTRAVENOUS EVERY 6 HOURS PRN
Status: DISCONTINUED | OUTPATIENT
Start: 2017-10-05 | End: 2017-10-13

## 2017-10-05 RX ADMIN — METRONIDAZOLE 500 MG: 500 INJECTION, SOLUTION INTRAVENOUS at 20:44

## 2017-10-05 RX ADMIN — IOHEXOL 50 ML: 240 INJECTION, SOLUTION INTRATHECAL; INTRAVASCULAR; INTRAVENOUS; ORAL at 14:47

## 2017-10-05 RX ADMIN — METRONIDAZOLE 500 MG: 500 INJECTION, SOLUTION INTRAVENOUS at 03:43

## 2017-10-05 RX ADMIN — PANTOPRAZOLE SODIUM 40 MG: 40 INJECTION, POWDER, FOR SOLUTION INTRAVENOUS at 08:22

## 2017-10-05 RX ADMIN — CEFEPIME HYDROCHLORIDE 1000 MG: 1 INJECTION, POWDER, FOR SOLUTION INTRAMUSCULAR; INTRAVENOUS at 04:30

## 2017-10-05 RX ADMIN — METOPROLOL TARTRATE 2.5 MG: 5 INJECTION INTRAVENOUS at 16:24

## 2017-10-05 RX ADMIN — METOPROLOL TARTRATE 2.5 MG: 5 INJECTION INTRAVENOUS at 08:23

## 2017-10-05 RX ADMIN — METRONIDAZOLE 500 MG: 500 INJECTION, SOLUTION INTRAVENOUS at 12:32

## 2017-10-05 RX ADMIN — ONDANSETRON 4 MG: 2 INJECTION INTRAMUSCULAR; INTRAVENOUS at 18:36

## 2017-10-05 RX ADMIN — HEPARIN SODIUM 5000 UNITS: 5000 INJECTION, SOLUTION INTRAVENOUS; SUBCUTANEOUS at 10:23

## 2017-10-05 RX ADMIN — POTASSIUM CHLORIDE, DEXTROSE MONOHYDRATE AND SODIUM CHLORIDE 100 ML/HR: 300; 5; 900 INJECTION, SOLUTION INTRAVENOUS at 20:44

## 2017-10-05 RX ADMIN — DEXTROSE AND SODIUM CHLORIDE 125 ML/HR: 5; .45 INJECTION, SOLUTION INTRAVENOUS at 06:22

## 2017-10-05 NOTE — CASE MANAGEMENT
Initial Clinical Review    Admission: Date/Time/Statement:JAREK [email or web address]    Orders Placed This Encounter   Procedures    Place in Observation (expected length of stay for this patient is less than two midnights)     Standing Status:   Standing     Number of Occurrences:   1     Order Specific Question:   Admitting Physician     Answer:   Liz De Leon [[id number]]     Order Specific Question:   Level of Care     Answer:   Med Surg [[id number]]         ED: Date/Time/Mode of Arrival:   ED Arrival Information     Expected Arrival Acuity Means of Arrival Escorted By Service Admission Type    - 10/4/2017 16:33 Urgent Ambulance [facility] Surgery-General Urgent    Arrival Complaint    Nausea          Chief Complaint:   Chief Complaint   Patient presents with    Abdominal Pain     per ems, patient has been experiencing n/v and abdominal pain for a few days  patient started experiencing diarrhea yesterday, patient vomiting upon arrival        Positive for cough and shortness of breath  Negative for choking  Cardiovascular: Negative for chest pain, palpitations and leg swelling  Gastrointestinal: Positive for abdominal pain, diarrhea, nausea and vomiting  Genitourinary: Negative for dysuria and urgency  Skin: Negative for pallor and wound  Neurological: Positive for weakness  History of Illness: Lucas Duke is a 80 y o  female who is originally admitted to the surgical service on 10/4/2017 due to SBO  We are consulted for medical management of HTN and CKD  She is noted to be a [employer] and resident of [address] [place] [address] in [facility]  She was noted present to the emergency department after having upper abdomen final pain which began on Tuesday more, followed by vomiting and diarrhea which began on Wednesday morning  The pain was noted to worsen and therefore she came to the emergency department for further evaluation    The patient is pleasantly confused and therefore I contacted her living facility who provided me additional information  They do not have any prior laboratory studies on record  The patient complained of abdominal pain 1st, followed by nausea, vomiting  They reported intermittent diarrhea as well      In the emergency department patient was noted to have a CT scan performed which showed evidence of suspected small-bowel obstruction  The patient had NG tube placed was subsequently admitted  In the ER the patient was noted to have accelerated hypertension with blood pressure as high as 800 systolic  The patient was noted to be initially ordered for IV hydralazine however this was canceled when blood pressure improved to 1 60s  She was noted to have bandemia of 47%      ED Vital Signs:   ED Triage Vitals   Temperature Pulse Respirations Blood Pressure SpO2   10/04/17 1639 10/04/17 1636 10/04/17 1636 10/04/17 1636 10/04/17 1636   98 8 °F (37 1 °C) 80 18 (!) 197/84 91 %      Temp Source Heart Rate Source Patient Position - Orthostatic VS BP Location FiO2 (%)   10/04/17 1639 10/04/17 1636 10/04/17 1636 10/04/17 1636 --   Oral Monitor Sitting Left arm       Pain Score       10/04/17 1638       6        Wt Readings from Last 1 Encounters:   10/04/17 49 7 kg (109 lb 9 1 oz)       Vital Signs (abnormal):   10/04/17 1915  --  72  16   215/88  96 %  Nasal cannula  Sitting   10/04/17 1854  --  75  18   204/82  96 %  Nasal cannula  --   10/04/17 1811  --  --  --  --  92 %  Nasal cannula  --   10/04/17 1806  --  75  18   187/79  90 %  None (Room air)  --   10/04/17 1650  --  --  --  --  --  None (Room air)  --   10/04/17 1639  98 8 °F (37 1 °C)  --  --  --  --  --  --   10/04/17 1636  --  80  18   197/84  91 %  None (Room air)           Abnormal Labs/Diagnostic Test Results:   BUN mg/dL 28*   CREATININE mg/dL 1 59*   CALCIUM mg/dL 9 4   TOTAL PROTEIN g/dL 7 7   BILIRUBIN TOTAL mg/dL 0 50   ALK PHOS U/L 68   ALT U/L 13   AST U/L 19   GLUCOSE RANDOM mg/dL 153*             Imaging: I have personally reviewed pertinent reports        Ct Abdomen Pelvis Wo Contrast  Result Date: 10/4/2017  Impression: 1  Distended stomach with multiple dilated small bowel loops  Transition point is not definitely identified but probably in the pelvic region  Findings suspicious for small bowel obstruction  2   Mild consolidation noted in the right lower lobe anteriorly and lingula  Large hiatal hernia noted, increased in size from the prior exam   Trace right pleural effusion           ED Treatment:   Medication Administration from 10/04/2017 1632 to 10/04/2017 2150       Date/Time Order Dose Route Action Action by Comments     10/04/2017 1642 ondansetron (ZOFRAN) injection 4 mg 4 mg Intravenous Given Monika Lyle RN      10/04/2017 1806 ondansetron (ZOFRAN) injection 4 mg 4 mg Intravenous Given Darrel Haynes RN      10/04/2017 2107 sodium chloride 0 9 % bolus 1,000 mL 0 mL Intravenous Stopped Rubina Barr RN      10/04/2017 1915 sodium chloride 0 9 % bolus 1,000 mL 1,000 mL Intravenous [address] Rubina Barr RN      10/04/2017 2053 dextrose 5 % and sodium chloride 0 45 % infusion 125 mL/hr Intravenous [address] Rubina Barr RN      10/04/2017 2044 morphine injection 2 mg 2 mg Intravenous Given Rubina Barr RN      10/04/2017 2118 hydrALAZINE (APRESOLINE) injection 10 mg 0 mg Intravenous Hold Rubina Barr RN Per Dr Ndiaye Drivers hold medication at this time for /72          Past Medical/Surgical History:    Active Ambulatory Problems     Diagnosis Date Noted    No Active Ambulatory Problems     Resolved Ambulatory Problems     Diagnosis Date Noted    No Resolved Ambulatory Problems     Past Medical History:   Diagnosis Date    breast cancer     GERD (gastroesophageal reflux disease)     Glaucoma     Gout     Hyperlipidemia     Hypertension     Osteoarthritis     Stroke [facility])        Admitting Diagnosis: Nausea [R11 0]  Small bowel obstruction [K56 609]  Hypertension [I10]  CKD (chronic kidney disease) [N18 9]  Chronic kidney disease, unspecified CKD stage [N18 9]  Hypertension, unspecified type [I10]  Intestinal obstruction, unspecified cause, unspecified whether partial or complete [K56 609]    Age/Sex: 80 y o  female    · Assessment/Plan: SBO: CT scan showing distended stomach with multiple dilated small bowel loops, transition point not clearly identified but suspected in pelvic region  As per primary service, planning for obstruction series in AM  NG tube decompression at this time  Monitor I/O, serial abdominal exams  · Bandemia: 47% bands on CBC  WBC 5 48  Could be reactive from SBO vs  Potential aspiration PNA  · RLL and lingular consolidation, POA: noted on CT on admission  Given RLL and vomiting on admission coupled with wheezing in this area on exam, concern for potential aspiration PNA  Would initiate cefepime/flagyl at this time and monitor resp status closely  · Trace right pleural effusion: noted incidentally on imaging  Does have BLE edema as well, will need to monitor volume status with IV fluid use  · Large hiatal hernia: noted on CT  · Hypertensive urgency, POA: SBP in /84 then increased to 215/88  Currently 176/71  Hydralazine x 1 was ordered but was not given as SBP decreased in the ER  At home on valsartan 320 mg, metoprolol 50 mg PO BID, lasix 20 mg daily and norvasc 2 5 mg daily  Add PRN IV hydralazine  Will schedule IV metoprolol in place of home 50 PO BID- will start at 2 5 IV q8hrs and titrate up as needed  · Renal insufficiency: unclear if represents ANDRE vs  Underlying CKD  Labs from 2016 and 9/7/2017 suggest baseline may be 1 6-1 7  Called SNF- no prior labs  Monitor with IVF use  · GERD: IV PPI  · HLD: resume statin when taking oral agents  · History of CVA: takes plavix, statin, BP control  Currently NPO, when tolerating oral diet, resume plavix and statin  · Peripheral neuropathy: supportive care     · Adenocarcinoma of breast: s/p left breast lumpectomy for invasive carcinoma  Follows with Dr Lilian Waldrop (last seen 6/2016)  · Glaucoma: timolol     VTE Prophylaxis: Heparin  / sequential compression device      Recommendations for Discharge:  · We will continue to follow along with you and make recommendations based on clinical course       SURGICAL CONSULT:    Assessment:  Jovita Gil is a 80y o  year old female with PMH significant for HTN, HLD, CKD, Breast cancer s/p L lumpectomy, h/o Stroke (?) who presented due to abd pain, nausea and vomiting  Pt cannot provide full history due to her mild confusion 2/2 aging at her baseline  She started to have generalized abd pain, nausea/vomiting x 3 days, getting worse, a/w loose stool  No fever, AMS noted        Hospital course: 2 day of no further nausea/vomiting; No BM or passing gas noted, per pt  Denies abd pain  PT been on NPO for PSBO which was showed on CT Scan, ABD XR retaken today and awaited for images  Pneumonia was diagnosed mainly based on imaging study, pt been on Cefepime and Metronidazole day 2, currently dry cough which deemed as her baseline, per pt  Managed for HTN, HLD, ANDRE on CKD, Pneumonia, GERD, Breast cancer s/p Left lumpectomy by IM team         Plan:  1  Partly Small Bowel Obstruction  - CT Scan: Distended stomach with multiple dilated small bowel loops, suspected for SBO  Large hiatal hernia  Mild consolidation/ R lower lobe anteriorly and lingular, trace right pleural effusion; awaiting Abd XR taken this morning  - WBC: 5 48 (10/4) > 8 05 (10/5) ; Lactic acid: 1 7 (10/4)  - no fever; no AMS (mild confused at her baseline); no BM and gas x 2 days, denies abd pain; Pe: abd soft, nontender, nondistended, no rebound or guarding, hypoactive BS though  - currently, no indications for urgently surgical intervention; we will continue NPO, supportive treatment, closely f/u on her clinical and paraclinical signs      2   Medical management: per IM team   We appreciate their support and effort         Admission Orders:  [email or web address]  NGT  NPO  SEQ COMP DEVICE    Scheduled Meds:   cefepime 1,000 mg Intravenous Q24H   heparin (porcine) 5,000 Units Subcutaneous Q8H [address]   hydrALAZINE 10 mg Intravenous Once   metoprolol 2 5 mg Intravenous Q8H   metroNIDAZOLE 500 mg Intravenous Q8H   pantoprazole 40 mg Intravenous Daily     Continuous Infusions:   dextrose 5 % and sodium chloride 0 45 % 125 mL/hr Last Rate: 125 mL/hr (10/05/17 0622)     PRN Meds:   carbamide peroxide    hydrALAZINE    HYDROmorphone    morphine injection X1    ondansetron

## 2017-10-05 NOTE — PROGRESS NOTES
Progress Note - Santiago Granado 80 y o  female MRN: [id number]    Unit/Bed#: [place] [facility] 2 -01 Encounter: [id number]      Assessment/Plan:  1-small bowel obstruction:  Patient's CT scan revealed evidence of small-bowel obstruction  Oral contrast was only seen on the most proximal left upper quadrant small bowel loops  The transition point was not identified  A mass or obstruction was not identified    -continue analgesics, antiemetics  -continue IV fluids   -continue NG tube   -patient is NPO   -patient is being followed by the surgical service, and may require surgical intervention    2-probable aspiration pneumonia:  Present on admission:  Admitting CAT scan revealed right lower lobe and left upper lobe consolidation  Patient had episodes of nausea and vomiting prior to admission, and will be at risk for aspiration pneumonia  Patient was started empirically on antibiotics with cefepime and Flagyl      -currently afebrile with a normal white blood cell count  3-hypertensive urgency:  Patient has a history of benign hypertension  Upon arrival to the ER her blood pressure was markedly elevated at 198/84  This is most likely secondary to pain, and distress    -As patient is currently NPO for her small-bowel obstruction she will be given IV antihypertensives  -patient was placed on scheduled IV metoprolol 2 5 mg every 8 hours    -blood pressure today with improved control  sbp ranged 136-150    -will continue this current regimen and monitor  Will titrate if needed  4-probable chronic kidney disease stage 3:  Patient's records revealed that the creatinine baseline is probably 1 6-1 7, consistent with chronic kidney disease stage 3  Continue to monitor    -creatinine improved to 1 4 with IV fluid support  5-GERD:  Continue proton pump inhibitor    6-hyperlipidemia:  Statin on hold while NPO    7-history of prior CVA:  The patient takes Plavix, and statin for secondary stroke prevention    Currently on hold as patient is NPO  8-blood management program:  Patient is noted to be a [employer]ism  9-preoperative evaluation:  Patient does not have any history of cardiac disease, or diabetes  She denies any current chest pain or discomfort  Will check an EKG  She does not have any symptoms concerning for unstable angina or significant congestive heart failure  She is currently hemodynamically stable       -if patient has a small-bowel obstruction with evidence of bowel compromise, the surgery would indeed be emergent, and she would need to proceed with the surgery without additional perioperative testing  VTE Pharmacologic Prophylaxis: Heparin  VTE Mechanical Prophylaxis: sequential compression device    Certification Statement: The patient will continue to require additional inpatient hospital stay due to need for further acute intervention for small-bowel obstruction    Status: inpatient     ===================================================================    Subjective:  Patient is examined and interviewed  Her chart and records are reviewed  I agree with the consult as constructed by the physician assistant  Patient is a 75-year-old female was admitted with abdominal pain, nausea and vomiting and found to have a small-bowel obstruction  Patient currently denies any abdominal pain  She notes nausea, and is agreeable to receiving anti emetics  She complains of a dry mouth  She also complains of discomfort from the NG tube  She denies any pain anywhere else  She denies any shortness of breath, chest congestion, phlegm, or cough  Physical Exam:   Temp:  [97 9 °F (36 6 °C)-99 3 °F (37 4 °C)] 98 8 °F (37 1 °C)  HR:  [61-80] 74  Resp:  [16-18] 16  BP: (136-215)/(52-88) 136/69    Gen:  Pleasant, non-tachypnic, non-dyspnic  Conversant  Lying with head of the bed elevated approximately 60°  NG tube in place    Heart: regular rate and rhythm, S1S2 present, no murmur, rub or gallop  Lungs:  Scattered rhonchi  No crackles or wheezing    No accessory muscle use or respiratory distress  Abd: soft, non-tender, non-distended  Hypoactive bowel sounds  no guarding, rebound or peritoneal signs  Extremities: no clubbing, cyanosis or edema  2+pedal pulses bilaterally  Full range of motion  Neuro: awake, alert  Fluent and goal directed speech  Skin: warm and dry: no petechiae, purpura and rash  LABS:     Results from last 7 days  Lab Units 10/05/17  0450 10/04/17  1641   WBC Thousand/uL 8 05 5 48   HEMOGLOBIN g/dL 11 0* 13 9   HEMATOCRIT % 34 9 43 1   PLATELETS Thousands/uL 213 246       Results from last 7 days  Lab Units 10/05/17  0450 10/04/17  1641   SODIUM mmol/L 140 142   POTASSIUM mmol/L 3 4* 4 3   CHLORIDE mmol/L 105 103   CO2 mmol/L 27 29   BUN mg/dL 29* 28*   CREATININE mg/dL 1 46* 1 59*   GLUCOSE RANDOM mg/dL 153* 153*   CALCIUM mg/dL 8 2* 9 4       Hospital Data:    10/5 CT abdomen/pelvis:  1   Focal patchy density in the right lower lobe with additional patchy density in the left upper lobe, likely multifocal pneumonia   Additional areas of consolidation in the right middle lobe, lingula, and bilateral lower lobes likely represent   atelectasis   Consider follow-up CT after treatment to reevaluate the focal density in the right lower lobe to exclude neoplasm Considerations related to the patient's age and/or comorbidities may be used to alter these recommendations     2   Continued small bowel obstruction without identification of the transition point   Note that oral contrast remained within the esophagus, stomach, and proximal small bowel loops  3   Large hiatal hernia with intrathoracic stomach      Chest x-ray:  Nasogastric tube position as above with a kinked appearance appreciated on the frontal radiograph  Stable cardio megaly and hiatal hernia    Low lung volumes with lung parenchyma appearing clear    10/4 CT abdomen/pelvis:  1   Distended stomach with multiple dilated small bowel loops   Transition point is not definitely identified but probably in the pelvic region   Findings suspicious for small bowel obstruction  2   Mild consolidation noted in the right lower lobe anteriorly and lingula   Large hiatal hernia noted, increased in size from the prior exam   Trace right pleural effusion   ---------------------------------------------------------------------------------------------------------------  This note has been constructed using a voice recognition system

## 2017-10-05 NOTE — PLAN OF CARE
DISCHARGE PLANNING     Discharge to home or other facility with appropriate resources Progressing        GASTROINTESTINAL - ADULT     Minimal or absence of nausea and/or vomiting Progressing     Maintains or returns to baseline bowel function Progressing     Maintains adequate nutritional intake Progressing        INFECTION - ADULT     Absence or prevention of progression during hospitalization Progressing     Absence of fever/infection during neutropenic period Progressing        Knowledge Deficit     Patient/family/caregiver demonstrates understanding of disease process, treatment plan, medications, and discharge instructions Progressing        PAIN - ADULT     Verbalizes/displays adequate comfort level or baseline comfort level Progressing        Potential for Falls     Patient will remain free of falls Progressing        Prexisting or High Potential for Compromised Skin Integrity     Skin integrity is maintained or improved Progressing        SAFETY ADULT     Maintain or return to baseline ADL function Progressing     Maintain or return mobility status to optimal level Progressing

## 2017-10-05 NOTE — TREATMENT PLAN
Updated niece at home  Mckinley cox at [phone]  At this point they still want full treatment  Hospitalist mentioned possible pneumonia  Will add a chest CT to today's CT scan with oral contrast     CT with oral contrast is still pending as is CT of chest     Abdomen is nontoxic and benign in lactic acid is 1 7, stable from last night at 1 8  However she still has a significant bandemia  This may be due to possible pneumonia or her GI tract  She is nontoxic and will likely try at least 24 hours of NG tube and bowel rest prior to considering surgical intervention  We have held Plavix for  the possibility of intervention  Shanjaqueline Glover is aware that I am handing off this patient to Manuel Powell, and Azul and I will not be in charge of her care but that these physicians and or  physician assistant representatives will update her periodically  She was encouraged to call to ask for updates as needed      Signature:   Pawan Kirkland MD  Date: 10/5/2017 Time: 1:10 PM

## 2017-10-05 NOTE — H&P
H&P Exam - Elzbieta Arroyo 80 y o  female MRN: [id number]    Unit/Bed#: [place] 2 -01 Encounter: [id number]    Assessment/Plan:  1  Partial bowel bowel obstruction with no history of previous surgery and a 26-year-old lady  Family would like surgery if necessary  Her abdomen is fairly benign this morning  Will check a CT scan with contrast to see if this will go through  Normal white blood cell count but bandemia  Per Medicine, possible aspiration pneumonia? Workup in progress  Hold Plavix until ascertaining whether surgical intervention appropriate  Chronic renal disease, not much different than usual   Slightly worse  Hypertensive urgency with blood pressures of 198/84 then up to 215/88 in the ER  Seen by Medicine  Will obtain CT scan with oral contrast to rule in or out a surgical abdomen  She is not toxic at this time  2  VTE Prophylaxis   Pharmacologic:    Mechanical: sequential compression device    3  Expected Length of Stay   I expect this patient to stay more than 2 midnights for bowel obstruction  4  Code Status: partial code  No Order    5  Discharge Planning      Rexann Klinefelter, MD  Date: 10/5/2017 Time: 10:43 AM       CC:  Nausea and vomiting for several days  Decreased bowel movements  HPI: Elzbieta Arroyo is a 80 y o  female who presents with 26-year-old female from a nursing home who is a [employer]ism and a residency of [address] [place] [address] [address]  She had upper abdominal pain and vomiting and diarrhea  CT scan showed a possible bowel obstruction  She also has chronic and acute, acute on chronic kidney disease, and hypertensive urgency  She was seen by Medicine last evening  She also has a bandemia of 40-55%          Historical Info:   Past Medical History:  Past Medical History:   Diagnosis Date    breast cancer     GERD (gastroesophageal reflux disease)     Glaucoma     Gout     Hyperlipidemia     Hypertension     Osteoarthritis     Stroke ([facility] [address] ) Past Surgical History:  Past Surgical History:   Procedure Laterality Date    ANKLE SURGERY      BREAST LUMPECTOMY Left     [id number]       Social History:  History   Alcohol Use    Yes     Comment: rarely      History   Drug Use No     History   Smoking Status    Never Smoker   Smokeless Tobacco    Never Used       Family History: non-contributory    Meds/Allergies   Prior to Admission Medications   Prescriptions Last Dose Informant Patient Reported?  Taking?   acetaminophen (TYLENOL) 325 mg tablet   Yes Yes   Sig: Take 650 mg by mouth every 6 (six) hours as needed for mild pain   amLODIPine (NORVASC) 2 5 mg tablet   Yes Yes   Sig: Take 2 5 mg by mouth daily   atorvastatin (LIPITOR) 20 mg tablet   Yes Yes   Sig: Take 20 mg by mouth daily at bedtime     calcium carbonate (TUMS EX) 750 mg chewable tablet   Yes Yes   Sig: Chew 1 tablet 4 (four) times a day as needed for indigestion or heartburn   carbamide peroxide (DEBROX) 6 5 % otic solution   Yes Yes   Sig: Administer 4 drops to the right ear daily as needed for ear pain   clopidogrel (PLAVIX) 75 mg tablet   Yes Yes   Sig: Take 75 mg by mouth daily   esomeprazole (NexIUM) 40 MG capsule   Yes Yes   Sig: Take 40 mg by mouth every morning before breakfast   ferrous sulfate 325 (65 Fe) mg tablet   Yes Yes   Sig: Take 325 mg by mouth Twice per week; sat & tues   furosemide (LASIX) 20 mg tablet   Yes Yes   Sig: Take 20 mg by mouth daily   guaiFENesin (ROBITUSSIN) 100 mg/5 mL syrup   Yes Yes   Sig: Take by mouth every 4 (four) hours as needed for cough   hydrocortisone 1 % cream   Yes Yes   Sig: Apply 1 application topically every 6 (six) hours as needed   loperamide (IMODIUM) 2 mg capsule   Yes Yes   Sig: Take 2 mg by mouth as needed for diarrhea   loratadine (CLARITIN) 10 mg tablet   Yes Yes   Sig: Take 10 mg by mouth daily   meloxicam (MOBIC) 7 5 mg tablet   Yes Yes   Sig: Take 7 5 mg by mouth 2 (two) times a day as needed   metoprolol tartrate (LOPRESSOR) 50 mg tablet   Yes Yes   Sig: Take 50 mg by mouth 2 (two) times a day   solifenacin (VESICARE) 5 mg tablet   Yes Yes   Sig: Take 5 mg by mouth daily     timolol (BETIMOL) 0 5 % ophthalmic solution   Yes Yes   Sig: Administer 1 drop to both eyes daily   traMADol (ULTRAM) 50 mg tablet   Yes Yes   Sig: Take 50 mg by mouth every 6 (six) hours as needed for moderate pain   valsartan (DIOVAN) 320 MG tablet   Yes Yes   Sig: Take 320 mg by mouth daily      Facility-Administered Medications: None     No Known Allergies    Objective     Vitals:  Vitals:    10/04/17 2109 10/04/17 2158 10/05/17 0306 10/05/17 0744   BP: 161/72 (!) 176/71 140/52 150/65   Pulse: 80 78  61   Resp: 16 16 17   Temp:  97 9 °F (36 6 °C)  99 3 °F (37 4 °C)   TempSrc:  Tympanic  Tympanic   SpO2: 94% 91%  96%   Weight:  49 7 kg (109 lb 9 1 oz)     Height:  5' (1 524 m)         I/O       10/03 0701 - 10/04 0700 10/04 0701 - 10/05 0700 10/05 0701 - 10/06 0700    I V  (mL/kg)  1000 (20 1)     IV Piggyback  500     Total Intake(mL/kg)  1500 (30 2)     Emesis/NG output  250     Total Output   250      Net   +1250                   Invasive Devices     Peripheral Intravenous Line            Peripheral IV 10/04/17 Right Antecubital less than 1 day          Drain            NG/OG/Enteral Tube Nasogastric 14 Fr Left mouth less than 1 day                    REVIEW OF SYSTEMS  General:   No Fever or chills; No significant weight loss or gain  EENT:   No ear pain, facial swelling; No sneezing, sore throat  Skin:   No rashes, color changes  Respiratory:     No shortness of breath, cough, wheezing, stridor  Cardiovascular:     No chest pain, palpitations  Gastrointestinal:    No nausea, vomiting, diarrhea; No abdominal pain except as described in HPI  Musculoskeletal:     No arthralgias, myalgias, swelling  Neurologic:   No dizziness, numbness, weakness  No speech difficulties     Psych:   No agitation, suicidal ideations    Otherwise, All other twelve-point review of systems normal          PHYSICAL EXAM  General Appearance:    Alert, cooperative, no distress, NG tube in place with dark brown output  More clear brown been fecal    Head:    Normocephalic without obvious abnormality   Eyes:    PERRL, conjunctiva/corneas clear, EOM's intact        Neck:   Supple, no adenopathy, no JVD   Back:     Symmetric, no spinal or CVA tenderness   Lungs:     Clear to auscultation bilaterally, no wheezing or rhonchi   Heart:    Regular rate and rhythm, S1 and S2 normal, no murmur   Abdomen:     Soft, relatively flat non distended  No peritoneal signs  Nurses placing a NG contrast through NG tube  Extremities:   Extremities normal  No clubbing, cyanosis or edema   Psych:   Normal Affect   Neurologic:  Skin:   CNII-XII intact  Strength symmetric, speech intact    No significant changes or jaundice  Lab Results:     Results from last 7 days  Lab Units 10/05/17  0450 10/04/17  1641   WBC Thousand/uL 8 05 5 48   HEMOGLOBIN g/dL 11 0* 13 9   HEMATOCRIT % 34 9 43 1   PLATELETS Thousands/uL 213 246   MONO PCT MAN % 5 6         Results from last 7 days  Lab Units 10/05/17  0450 10/04/17  1641   SODIUM mmol/L 140 142   POTASSIUM mmol/L 3 4* 4 3   CHLORIDE mmol/L 105 103   CO2 mmol/L 27 29   BUN mg/dL 29* 28*   CREATININE mg/dL 1 46* 1 59*   CALCIUM mg/dL 8 2* 9 4   TOTAL PROTEIN g/dL 6 0* 7 7   BILIRUBIN TOTAL mg/dL 0 35 0 50   ALK PHOS U/L 47 68   ALT U/L 9* 13   AST U/L 12 19   GLUCOSE RANDOM mg/dL 153* 153*       Imaging: I personally reviewed the radiology studies, my impression is as follows:  CT scan which shows possible bowel obstruction  Patient has no previous surgery       Code Status: No Order    Donovan Palacios MD  Date: 10/5/2017 Time: 10:43 AM

## 2017-10-05 NOTE — CONSULTS
Inpatient Medical Consultation - [facility] Internal Medicine    Patient Information: Lucas Duke 80 y o  female MRN: [id number]  Unit/Bed#: [facility] 68 2 [address] 87 [id number] Encounter: [id number]  PCP: Ronni Helton DO  Date of Admission:  10/4/2017  Date of Consultation: 10/05/17  Requesting Physician: No att  providers found    Reason For Consultation:   "HTN, CKD"    Assessment/Plan:    · SBO: CT scan showing distended stomach with multiple dilated small bowel loops, transition point not clearly identified but suspected in pelvic region  As per primary service, planning for obstruction series in AM  NG tube decompression at this time  Monitor I/O, serial abdominal exams  · Bandemia: 47% bands on CBC  WBC 5 48  Could be reactive from SBO vs  Potential aspiration PNA  · RLL and lingular consolidation, POA: noted on CT on admission  Given RLL and vomiting on admission coupled with wheezing in this area on exam, concern for potential aspiration PNA  Would initiate cefepime/flagyl at this time and monitor resp status closely  · Trace right pleural effusion: noted incidentally on imaging  Does have BLE edema as well, will need to monitor volume status with IV fluid use  · Large hiatal hernia: noted on CT  · Hypertensive urgency, POA: SBP in /84 then increased to 215/88  Currently 176/71  Hydralazine x 1 was ordered but was not given as SBP decreased in the ER  At home on valsartan 320 mg, metoprolol 50 mg PO BID, lasix 20 mg daily and norvasc 2 5 mg daily  Add PRN IV hydralazine  Will schedule IV metoprolol in place of home 50 PO BID- will start at 2 5 IV q8hrs and titrate up as needed  · Renal insufficiency: unclear if represents ANDRE vs  Underlying CKD  Labs from 2016 and 9/7/2017 suggest baseline may be 1 6-1 7  Called SNF- no prior labs  Monitor with IVF use  · GERD: IV PPI  · HLD: resume statin when taking oral agents  · History of CVA: takes plavix, statin, BP control   Currently NPO, when tolerating oral diet, resume plavix and statin  · Peripheral neuropathy: supportive care  · Adenocarcinoma of breast: s/p left breast lumpectomy for invasive carcinoma  Follows with Dr Shelley Motta (last seen 6/2016)  · Glaucoma: timolol    VTE Prophylaxis: Heparin  / sequential compression device     Recommendations for Discharge:  · We will continue to follow along with you and make recommendations based on clinical course  Counseling / Coordination of Care Time: 1 hour  Greater than 50% of total time spent on patient counseling and coordination of care  Collaboration of Care: Were Recommendations Directly Discussed with Primary Treatment Team? - No     History of Present Illness:    Lary Bedoya is a 80 y o  female who is originally admitted to the surgical service on 10/4/2017 due to SBO  We are consulted for medical management of HTN and CKD  She is noted to be a [employer] and resident of [address] [place] [address] in [facility]  She was noted present to the emergency department after having upper abdomen final pain which began on Tuesday more, followed by vomiting and diarrhea which began on Wednesday morning  The pain was noted to worsen and therefore she came to the emergency department for further evaluation  The patient is pleasantly confused and therefore I contacted her living facility who provided me additional information  They do not have any prior laboratory studies on record  The patient complained of abdominal pain 1st, followed by nausea, vomiting  They reported intermittent diarrhea as well  In the emergency department patient was noted to have a CT scan performed which showed evidence of suspected small-bowel obstruction  The patient had NG tube placed was subsequently admitted  In the ER the patient was noted to have accelerated hypertension with blood pressure as high as 409 systolic    The patient was noted to be initially ordered for IV hydralazine however this was canceled when blood pressure improved to 1 60s  She was noted to have bandemia of 47%  Review of Systems:    Review of Systems   Constitutional: Negative for chills and fever  Respiratory: Positive for cough and shortness of breath  Negative for choking  Cardiovascular: Negative for chest pain, palpitations and leg swelling  Gastrointestinal: Positive for abdominal pain, diarrhea, nausea and vomiting  Genitourinary: Negative for dysuria and urgency  Skin: Negative for pallor and wound  Neurological: Positive for weakness  All other systems reviewed and are negative  Past Medical and Surgical History:     Past Medical History:   Diagnosis Date    Breast cancer     GERD (gastroesophageal reflux disease)     Glaucoma     Gout     Hyperlipidemia     Hypertension     Osteoarthritis     Stroke [facility])        Past Surgical History:   Procedure Laterality Date    ANKLE SURGERY         Meds/Allergies:    PTA meds:   Prior to Admission Medications   Prescriptions Last Dose Informant Patient Reported?  Taking?   acetaminophen (TYLENOL) 325 mg tablet   Yes Yes   Sig: Take 650 mg by mouth every 6 (six) hours as needed for mild pain   amLODIPine (NORVASC) 2 5 mg tablet   Yes Yes   Sig: Take 2 5 mg by mouth daily   atorvastatin (LIPITOR) 20 mg tablet   Yes Yes   Sig: Take 20 mg by mouth daily at bedtime     calcium carbonate (TUMS EX) 750 mg chewable tablet   Yes Yes   Sig: Chew 1 tablet 4 (four) times a day as needed for indigestion or heartburn   carbamide peroxide (DEBROX) 6 5 % otic solution   Yes Yes   Sig: Administer 4 drops to the right ear daily as needed for ear pain   clopidogrel (PLAVIX) 75 mg tablet   Yes Yes   Sig: Take 75 mg by mouth daily   esomeprazole (NexIUM) 40 MG capsule   Yes Yes   Sig: Take 40 mg by mouth every morning before breakfast   ferrous sulfate 325 (65 Fe) mg tablet   Yes Yes   Sig: Take 325 mg by mouth Twice per week; sat & tues   furosemide (LASIX) 20 mg tablet   Yes Yes   Sig: Take 20 mg by mouth daily   guaiFENesin (ROBITUSSIN) 100 mg/5 mL syrup   Yes Yes   Sig: Take by mouth every 4 (four) hours as needed for cough   hydrocortisone 1 % cream   Yes Yes   Sig: Apply 1 application topically every 6 (six) hours as needed   loperamide (IMODIUM) 2 mg capsule   Yes Yes   Sig: Take 2 mg by mouth as needed for diarrhea   loratadine (CLARITIN) 10 mg tablet   Yes Yes   Sig: Take 10 mg by mouth daily   meloxicam (MOBIC) 7 5 mg tablet   Yes Yes   Sig: Take 7 5 mg by mouth 2 (two) times a day as needed   metoprolol tartrate (LOPRESSOR) 50 mg tablet   Yes Yes   Sig: Take 50 mg by mouth 2 (two) times a day   solifenacin (VESICARE) 5 mg tablet   Yes Yes   Sig: Take 5 mg by mouth daily     timolol (BETIMOL) 0 5 % ophthalmic solution   Yes Yes   Sig: Administer 1 drop to both eyes daily   traMADol (ULTRAM) 50 mg tablet   Yes Yes   Sig: Take 50 mg by mouth every 6 (six) hours as needed for moderate pain   valsartan (DIOVAN) 320 MG tablet   Yes Yes   Sig: Take 320 mg by mouth daily      Facility-Administered Medications: None       Allergies: No Known Allergies    Social History:     Marital Status: Single    Substance Use History:   History   Alcohol Use    Yes     Comment: rarely      History   Smoking Status    Never Smoker   Smokeless Tobacco    Never Used     History   Drug Use No       Family History:    History reviewed  No pertinent family history  Physical Exam:     Vitals:   Blood Pressure: (!) 176/71 (10/04/17 2158)  Pulse: 78 (10/04/17 2158)  Temperature: 97 9 °F (36 6 °C) (10/04/17 2158)  Temp Source: Tympanic (10/04/17 2158)  Respirations: 16 (10/04/17 2158)  Height: 5' (152 4 cm) (10/04/17 2158)  Weight - Scale: 49 7 kg (109 lb 9 1 oz) (10/04/17 2158)  SpO2: 91 % (10/04/17 2158)    Physical Exam   Constitutional: No distress  HENT:   Head: Normocephalic and atraumatic  NG tube   Neck: No JVD present  Cardiovascular: Normal rate, regular rhythm, normal heart sounds and intact distal pulses  No murmur heard  Pulmonary/Chest: Effort normal  No respiratory distress  She has wheezes (right base)  She has rales (right base)  Abdominal: She exhibits distension (minimal)  There is tenderness (diffusely, no rebound or guarding)  There is no rebound and no guarding  Hypoactive bs   Musculoskeletal: She exhibits edema (BLE 1+)  She exhibits no tenderness  Neurological: She is alert  oriented to person, not to place or time  Able to identify birthday and name but not year  States she is at home at this time  Appears pleasantly confused   Skin: She is not diaphoretic  Nursing note and vitals reviewed  Additional Data:     Lab Results: I have personally reviewed pertinent reports  and I have personally reviewed pertinent films in PACS      Results from last 7 days  Lab Units 10/04/17  1641   WBC Thousand/uL 5 48   HEMOGLOBIN g/dL 13 9   HEMATOCRIT % 43 1   PLATELETS Thousands/uL 246   LYMPHO PCT % 21   MONO PCT MAN % 6   EOSINO PCT MANUAL % 0       Results from last 7 days  Lab Units 10/04/17  1641   SODIUM mmol/L 142   POTASSIUM mmol/L 4 3   CHLORIDE mmol/L 103   CO2 mmol/L 29   BUN mg/dL 28*   CREATININE mg/dL 1 59*   CALCIUM mg/dL 9 4   TOTAL PROTEIN g/dL 7 7   BILIRUBIN TOTAL mg/dL 0 50   ALK PHOS U/L 68   ALT U/L 13   AST U/L 19   GLUCOSE RANDOM mg/dL 153*           Imaging: I have personally reviewed pertinent reports  Ct Abdomen Pelvis Wo Contrast  Result Date: 10/4/2017  Impression: 1  Distended stomach with multiple dilated small bowel loops  Transition point is not definitely identified but probably in the pelvic region  Findings suspicious for small bowel obstruction  2   Mild consolidation noted in the right lower lobe anteriorly and lingula  Large hiatal hernia noted, increased in size from the prior exam   Trace right pleural effusion  EKG, Pathology, and Other Studies Reviewed on Admission:   · Prior records/admissions       ** Please Note: This note has been constructed using a voice recognition system   **

## 2017-10-05 NOTE — TREATMENT PLAN
Treatment Plan - General Surgery   Liborio Adams 80 y o  female MRN: [id number]    Small-bowel obstruction  Abdomen benign with no peritoneal signs  She does not have any rebound or guarding  She has absent bowel sounds to prolonged auscultation  Patient pleasantly demented and unable to give me a good history  Patient is clinically nontoxic and stable  She does however have an increasing bandemia and a significant small-bowel obstruction on CT  Plan:  CT of the abdomen and pelvis with oral contrast today  Oral contrast by NG tube  Administer oral contrast and then wait 2 hours  Plan discussed with Dr Samuel Odonnell  CT ordered  Joe Sanchez RN aware       Signature:   Chio Jennings PA-C  Date: 10/5/2017 Time: 8:57 AM

## 2017-10-05 NOTE — CONSULTS
Progress Note - Aixa Porter 80 y o  female MRN: [id number]    Unit/Bed#: [facility] 68 2 -01 Encounter: [id number]      Assessment:  Aixa Porter is a 80y o  year old female with PMH significant for HTN, HLD, CKD, Breast cancer s/p L lumpectomy, h/o Stroke (?) who presented due to abd pain, nausea and vomiting  Pt cannot provide full history due to her mild confusion 2/2 aging at her baseline  She started to have generalized abd pain, nausea/vomiting x 3 days, getting worse, a/w loose stool  No fever, AMS noted  Hospital course: 2 day of no further nausea/vomiting; No BM or passing gas noted, per pt  Denies abd pain  PT been on NPO for PSBO which was showed on CT Scan, ABD XR retaken today and awaited for images  Pneumonia was diagnosed mainly based on imaging study, pt been on Cefepime and Metronidazole day 2, currently dry cough which deemed as her baseline, per pt  Managed for HTN, HLD, ANDRE on CKD, Pneumonia, GERD, Breast cancer s/p Left lumpectomy by IM team       Plan:  1  Partly Small Bowel Obstruction  - CT Scan: Distended stomach with multiple dilated small bowel loops, suspected for SBO  Large hiatal hernia  Mild consolidation/ R lower lobe anteriorly and lingular, trace right pleural effusion; awaiting Abd XR taken this morning  - WBC: 5 48 (10/4) > 8 05 (10/5) ; Lactic acid: 1 7 (10/4)  - no fever; no AMS (mild confused at her baseline); no BM and gas x 2 days, denies abd pain; Pe: abd soft, nontender, nondistended, no rebound or guarding, hypoactive BS though  - currently, no indications for urgently surgical intervention; we will continue NPO, supportive treatment, closely f/u on her clinical and paraclinical signs     2  Medical management: per IM team   We appreciate their support and effort      Plan discussed with Dr Te Davila    Subjective:   No BS and gas pass since getting CT scan abd/pel done  Still has nausea but no vomiting  NPO  No fever    Denies chest pain, palpitation, SOB, cough, wheezing, abd pain         Objective:     Vitals:  Vitals:    10/04/17 2109 10/04/17 2158 10/05/17 0306 10/05/17 0744   BP: 161/72 (!) 176/71 140/52 150/65   Pulse: 80 78  61   Resp: 16 16 17   Temp:  97 9 °F (36 6 °C)  99 3 °F (37 4 °C)   TempSrc:  Tympanic  Tympanic   SpO2: 94% 91%  96%   Weight:  49 7 kg (109 lb 9 1 oz)     Height:  5' (1 524 m)           Intake/Output Summary (Last 24 hours) at 10/05/17 0817  Last data filed at 10/05/17 3235   Gross per 24 hour   Intake             1500 ml   Output              250 ml   Net             1250 ml       Invasive Devices     Peripheral Intravenous Line            Peripheral IV 10/04/17 Right Antecubital less than 1 day          Drain            NG/OG/Enteral Tube Nasogastric 14 Fr Left mouth less than 1 day                  Labs:   Admission on 10/04/2017   Component Date Value    WBC 10/04/2017 5 48     RBC 10/04/2017 4 48     Hemoglobin 10/04/2017 13 9     Hematocrit 10/04/2017 43 1     MCV 10/04/2017 96     MCH 10/04/2017 31 0     MCHC 10/04/2017 32 3     RDW 10/04/2017 14 9     MPV 10/04/2017 10 2     Platelets 94/16/8560 246     nRBC 10/04/2017 0     Sodium 10/04/2017 142     Potassium 10/04/2017 4 3     Chloride 10/04/2017 103     CO2 10/04/2017 29     Anion Gap 10/04/2017 10     BUN 10/04/2017 28*    Creatinine 10/04/2017 1 59*    Glucose 10/04/2017 153*    Calcium 10/04/2017 9 4     AST 10/04/2017 19     ALT 10/04/2017 13     Alkaline Phosphatase 10/04/2017 68     Total Protein 10/04/2017 7 7     Albumin 10/04/2017 3 1*    Total Bilirubin 10/04/2017 0 50     eGFR 10/04/2017 27     Lipase 10/04/2017 41*    LACTIC ACID 10/04/2017 1 7     Segmented % 10/04/2017 23*    Bands % 10/04/2017 47*    Lymphocytes % 10/04/2017 21     Monocytes % 10/04/2017 6     Eosinophils % 10/04/2017 0     Basophils % 10/04/2017 0     Metamyelocytes% 10/04/2017 3*    Absolute Neutrophils 10/04/2017 3 84     Lymphocytes Absolute 10/04/2017 1 15     Monocytes Absolute 10/04/2017 0 33     Eosinophils Absolute 10/04/2017 0 00     Basophils Absolute 10/04/2017 0 00     Total Counted 10/04/2017 100     Dohle Bodies 10/04/2017 Present     Toxic Vacuolated Neutrop* 10/04/2017 Present     Anisocytosis 10/04/2017 Present     Platelet Estimate 27/67/2309 Adequate     WBC 10/05/2017 8 05     RBC 10/05/2017 3 62*    Hemoglobin 10/05/2017 11 0*    Hematocrit 10/05/2017 34 9     MCV 10/05/2017 96     MCH 10/05/2017 30 4     MCHC 10/05/2017 31 5     RDW 10/05/2017 14 9     MPV 10/05/2017 10 5     Platelets 20/20/4234 213     nRBC 10/05/2017 0     Sodium 10/05/2017 140     Potassium 10/05/2017 3 4*    Chloride 10/05/2017 105     CO2 10/05/2017 27     Anion Gap 10/05/2017 8     BUN 10/05/2017 29*    Creatinine 10/05/2017 1 46*    Glucose 10/05/2017 153*    Calcium 10/05/2017 8 2*    AST 10/05/2017 12     ALT 10/05/2017 9*    Alkaline Phosphatase 10/05/2017 47     Total Protein 10/05/2017 6 0*    Albumin 10/05/2017 2 4*    Total Bilirubin 10/05/2017 0 35     eGFR 10/05/2017 30     Segmented % 10/05/2017 24*    Bands % 10/05/2017 52*    Lymphocytes % 10/05/2017 15     Monocytes % 10/05/2017 5     Eosinophils % 10/05/2017 0     Basophils % 10/05/2017 0     Metamyelocytes% 10/05/2017 3*    Myelocytes % 10/05/2017 1     Absolute Neutrophils 10/05/2017 6 12     Lymphocytes Absolute 10/05/2017 1 21     Monocytes Absolute 10/05/2017 0 40     Eosinophils Absolute 10/05/2017 0 00     Basophils Absolute 10/05/2017 0 00     Total Counted 10/05/2017 100     Dohle Bodies 10/05/2017 Present     RBC Morphology 10/05/2017 Normal     Platelet Estimate 52/62/9109 Adequate     Large Platelet 53/39/5717 Present        Physical Exam     GEN: awake but mild confused  HEENT: no pallor or icterus  HEART: RRR, S1S2 nl  LUNGS: no increased WOB, CTAB  ABD: soft, nondistension, nontenderness; hypoactive BS  EXT: Edema 2+/ LE b/l; no rash  Motor: 3/5 throughout    Current Facility-Administered Medications   Medication Dose Route Frequency    carbamide peroxide (DEBROX) 6 5 % otic solution 4 drop  4 drop Right Ear Daily PRN    cefepime (MAXIPIME) 1,000 mg in dextrose 5 % 50 mL IVPB  1,000 mg Intravenous Q24H    dextrose 5 % and sodium chloride 0 45 % infusion  125 mL/hr Intravenous Continuous    heparin (porcine) subcutaneous injection 5,000 Units  5,000 Units Subcutaneous Q8H [address]    hydrALAZINE (APRESOLINE) injection 10 mg  10 mg Intravenous Once    hydrALAZINE (APRESOLINE) injection 5 mg  5 mg Intravenous Q6H PRN    HYDROmorphone (DILAUDID) 2 mg/mL injection 1 mg  1 mg Intravenous Q3H PRN    metoprolol (LOPRESSOR) injection 2 5 mg  2 5 mg Intravenous Q8H    metroNIDAZOLE (FLAGYL) IVPB (premix) 500 mg  500 mg Intravenous Q8H    morphine injection 2 mg  2 mg Intravenous Q2H PRN    ondansetron (ZOFRAN) injection 4 mg  4 mg Intravenous Q4H PRN    pantoprazole (PROTONIX) injection 40 mg  40 mg Intravenous Daily     No Known Allergies      Imaging/Other:      VTE Pharmacologic Prophylaxis: Heparin  VTE Mechanical Prophylaxis: sequential compression device      Emilia Burns MD  PGY-1

## 2017-10-05 NOTE — TREATMENT PLAN
Reviewed CAT scan personally  Contrast is not yet through to the colon but there is air and stool in the colon and rectum  She is nontoxic at this point and we will check blood work in the morning and serial exams to determine whether surgery intervention will be needed      Signature:   Diana Muro MD  Date: 10/5/2017 Time: 3:32 PM

## 2017-10-05 NOTE — CASE MANAGEMENT
Initial Clinical Review    Admission: Date/Time/Statement: OBS 10/4 at 1958;  UPGRADED to INPT 10/5 at 1300    10/05/17 1300  Inpatient Admission Once     Transfer Service: Surgery-General       Question Answer Comment   Admitting Physician JESICA JUAREZ    Level of Care Med Surg    Estimated length of stay More than 2 Midnights    Certification I certify that inpatient services are medically necessary for this patient for a duration of greater than two midnights  See H&P and MD Progress Notes for additional information about the patient's course of treatment  10/05/17 1300         Orders Placed This Encounter   Procedures    Place in Observation (expected length of stay for this patient is less than two midnights)     Standing Status:   Standing     Number of Occurrences:   1     Order Specific Question:   Admitting Physician     Answer:   Lula Carcamo [[id number]]     Order Specific Question:   Level of Care     Answer:   Med Surg [16]       ED: Date/Time/Mode of Arrival:   ED Arrival Information     Expected Arrival Acuity Means of Arrival Escorted By Service Admission Type    - 10/4/2017 16:33 Urgent Ambulance [facility] Surgery-General Urgent    Arrival Complaint    Nausea        Chief Complaint:   Chief Complaint   Patient presents with    Abdominal Pain     per ems, patient has been experiencing n/v and abdominal pain for a few days  patient started experiencing diarrhea yesterday, patient vomiting upon arrival         History of Illness: 80y o  year old female with PMH significant for HTN, HLD, CKD, Breast cancer s/p L lumpectomy, h/o Stroke (?) who presented due to abd pain, nausea and vomiting  Pt cannot provide full history due to her mild confusion 2/2 aging at her baseline  She started to have generalized abd pain, nausea/vomiting x 3 days, getting worse, a/w loose stool  No fever, AMS noted        ED Vital Signs:   ED Triage Vitals   Temperature Pulse Respirations Blood Pressure SpO2   10/04/17 1639 10/04/17 1636 10/04/17 1636 10/04/17 1636 10/04/17 1636   98 8 °F (37 1 °C) 80 18 (!) 197/84 91 %      Temp Source Heart Rate Source Patient Position - Orthostatic VS BP Location FiO2 (%)   10/04/17 1639 10/04/17 1636 10/04/17 1636 10/04/17 1636 --   Oral Monitor Sitting Left arm       Pain Score       10/04/17 1638       6        Wt Readings from Last 1 Encounters:   10/04/17 49 7 kg (109 lb 9 1 oz)       Vital Signs (abnormal):   10/04/17 2033 -- 78 16  176/80 98 % -- ARC   10/04/17 1915 -- 72 16  215/88 96 % -- NT   10/04/17 1854 -- 75 18  204/82 96 % -- KMS   10/04/17 1811 -- -- -- -- 92 % -- [facility] [place]   10/04/17 1806 -- 75 18  187/79 90 % -- [facility] [place]   10/04/17 1639 98 8 °F (37 1 °C) -- -- --            Abnormal Labs/Diagnostic Test Results: bun 28,  Cr 1 59,  Glu 153,  Bands 47    CT A&P: 1   Distended stomach with multiple dilated small bowel loops   Transition point is not definitely identified but probably in the pelvic region   Findings suspicious for small bowel obstruction  2   Mild consolidation noted in the right lower lobe anteriorly and lingula   Large hiatal hernia noted, increased in size from the prior exam   Trace right pleural effusion      ED Treatment:   Medication Administration from 10/04/2017 1632 to 10/04/2017 2150       Date/Time Order Dose Route Action Action by Comments     10/04/2017 1642 ondansetron (ZOFRAN) injection 4 mg 4 mg Intravenous Given Essie Granda RN      10/04/2017 1806 ondansetron (ZOFRAN) injection 4 mg 4 mg Intravenous Given Jay Jay Gordon RN      10/04/2017 2107 sodium chloride 0 9 % bolus 1,000 mL 0 mL Intravenous Stopped Jesse Schulte RN      10/04/2017 1915 sodium chloride 0 9 % bolus 1,000 mL 1,000 mL Intravenous Kimberly 37 Jesse Schulte RN      10/04/2017 2053 dextrose 5 % and sodium chloride 0 45 % infusion 125 mL/hr Intravenous Kimberly Schulte RN      10/04/2017 2044 morphine injection 2 mg 2 mg Intravenous Given Hedy Ortiz Octavia Randolph RN      10/04/2017 2118 hydrALAZINE (APRESOLINE) injection 10 mg 0 mg Intravenous Hold Chary Stahl RN Per Dr Lisa Dumont hold medication at this time for /72          Past Medical/Surgical History: Active Ambulatory Problems     Diagnosis Date Noted    No Active Ambulatory Problems     Resolved Ambulatory Problems     Diagnosis Date Noted    No Resolved Ambulatory Problems     Past Medical History:   Diagnosis Date    breast cancer     GERD (gastroesophageal reflux disease)     Glaucoma     Gout     Hyperlipidemia     Hypertension     Osteoarthritis     Stroke [facility])        Admitting Diagnosis: Nausea [R11 0]  Small bowel obstruction [K56 609]  Hypertension [I10]  CKD (chronic kidney disease) [N18 9]  Chronic kidney disease, unspecified CKD stage [N18 9]  Hypertension, unspecified type [I10]  Intestinal obstruction, unspecified cause, unspecified whether partial or complete [K56 609]    Age/Sex: 80 y o  female    Assessment/Plan:   Hospital course: 2 day of no further nausea/vomiting; No BM or passing gas noted, per pt  Denies abd pain  PT been on NPO for PSBO which was showed on CT Scan, ABD XR retaken today and awaited for images  Pneumonia was diagnosed mainly based on imaging study, pt been on Cefepime and Metronidazole day 2, currently dry cough which deemed as her baseline, per pt  Managed for HTN, HLD, ANDRE on CKD, Pneumonia, GERD, Breast cancer s/p Left lumpectomy by IM team      Plan:  1  Partly Small Bowel Obstruction  - CT Scan: Distended stomach with multiple dilated small bowel loops, suspected for SBO  Large hiatal hernia  Mild consolidation/ R lower lobe anteriorly and lingular, trace right pleural effusion; awaiting Abd XR taken this morning  - WBC: 5 48 (10/4) > 8 05 (10/5) ; Lactic acid: 1 7 (10/4)  - no fever; no AMS (mild confused at her baseline); no BM and gas x 2 days, denies abd pain;  Pe: abd soft, nontender, nondistended, no rebound or guarding, hypoactive BS though  - currently, no indications for urgently surgical intervention; we will continue NPO, supportive treatment, closely f/u on her clinical and paraclinical signs      2  Medical management: per IM team   We appreciate their support and effort     Admission Orders:  NPO  NG Tube to [facility]  SCD's  Consult Medicine    Scheduled Meds:   cefepime 1,000 mg Intravenous Q24H   heparin (porcine) 5,000 Units Subcutaneous Q8H [address]   hydrALAZINE 10 mg Intravenous Once   metoprolol 2 5 mg Intravenous Q8H   metroNIDAZOLE 500 mg Intravenous Q8H   pantoprazole 40 mg Intravenous Daily     Continuous Infusions:   dextrose 5 % and sodium chloride 0 45 % 125 mL/hr Last Rate: 125 mL/hr (10/05/17 0622)     PRN Meds:   carbamide peroxide    hydrALAZINE    HYDROmorphone    morphine injection    ondansetron      [facility] in the [facility] by [address] for 2017  Network Utilization Review Department  Phone: [phone]; Fax [phone]  ATTENTION: The Network Utilization Review Department is now centralized for our 7 Facilities  Make a note that we have a new phone and fax numbers for our Department  Please call with any questions or concerns to [phone] and carefully follow the prompts so that you are directed to the right person  All voicemails are confidential  Fax any determinations, approvals, denials, and requests for initial or continue stay review clinical to [phone]  Due to HIGH CALL volume, it would be easier if you could please send faxed requests to expedite your requests and in part, help us provide discharge notifications faster

## 2017-10-06 ENCOUNTER — APPOINTMENT (INPATIENT)
Dept: RADIOLOGY | Facility: HOSPITAL | Age: 82
DRG: 388 | End: 2017-10-06
Payer: MEDICARE

## 2017-10-06 PROBLEM — K44.9 HIATAL HERNIA: Chronic | Status: ACTIVE | Noted: 2017-10-06

## 2017-10-06 PROCEDURE — C9113 INJ PANTOPRAZOLE SODIUM, VIA: HCPCS | Performed by: SURGERY

## 2017-10-06 PROCEDURE — 74022 RADEX COMPL AQT ABD SERIES: CPT

## 2017-10-06 RX ORDER — PROMETHAZINE HYDROCHLORIDE 25 MG/ML
12.5 INJECTION, SOLUTION INTRAMUSCULAR; INTRAVENOUS EVERY 6 HOURS PRN
Status: DISCONTINUED | OUTPATIENT
Start: 2017-10-06 | End: 2017-10-06

## 2017-10-06 RX ORDER — PROMETHAZINE HYDROCHLORIDE 25 MG/ML
12.5 INJECTION, SOLUTION INTRAMUSCULAR; INTRAVENOUS EVERY 6 HOURS PRN
Status: DISCONTINUED | OUTPATIENT
Start: 2017-10-06 | End: 2017-10-14 | Stop reason: HOSPADM

## 2017-10-06 RX ADMIN — METRONIDAZOLE 500 MG: 500 INJECTION, SOLUTION INTRAVENOUS at 04:26

## 2017-10-06 RX ADMIN — METRONIDAZOLE 500 MG: 500 INJECTION, SOLUTION INTRAVENOUS at 20:09

## 2017-10-06 RX ADMIN — POTASSIUM CHLORIDE, DEXTROSE MONOHYDRATE AND SODIUM CHLORIDE 100 ML/HR: 300; 5; 900 INJECTION, SOLUTION INTRAVENOUS at 08:38

## 2017-10-06 RX ADMIN — HEPARIN SODIUM 5000 UNITS: 5000 INJECTION, SOLUTION INTRAVENOUS; SUBCUTANEOUS at 13:15

## 2017-10-06 RX ADMIN — METOPROLOL TARTRATE 2.5 MG: 5 INJECTION INTRAVENOUS at 16:16

## 2017-10-06 RX ADMIN — METRONIDAZOLE 500 MG: 500 INJECTION, SOLUTION INTRAVENOUS at 13:00

## 2017-10-06 RX ADMIN — PANTOPRAZOLE SODIUM 40 MG: 40 INJECTION, POWDER, FOR SOLUTION INTRAVENOUS at 08:33

## 2017-10-06 RX ADMIN — ONDANSETRON 4 MG: 2 INJECTION INTRAMUSCULAR; INTRAVENOUS at 13:10

## 2017-10-06 RX ADMIN — PROMETHAZINE HYDROCHLORIDE 12.5 MG: 25 INJECTION INTRAMUSCULAR; INTRAVENOUS at 20:16

## 2017-10-06 RX ADMIN — METOPROLOL TARTRATE 2.5 MG: 5 INJECTION INTRAVENOUS at 08:33

## 2017-10-06 RX ADMIN — HEPARIN SODIUM 5000 UNITS: 5000 INJECTION, SOLUTION INTRAVENOUS; SUBCUTANEOUS at 22:00

## 2017-10-06 RX ADMIN — POTASSIUM CHLORIDE, DEXTROSE MONOHYDRATE AND SODIUM CHLORIDE 75 ML/HR: 300; 5; 900 INJECTION, SOLUTION INTRAVENOUS at 22:00

## 2017-10-06 RX ADMIN — CEFEPIME HYDROCHLORIDE 1000 MG: 1 INJECTION, POWDER, FOR SOLUTION INTRAMUSCULAR; INTRAVENOUS at 03:34

## 2017-10-06 NOTE — PROGRESS NOTES
Patient now refusing monitoring of vital signs and all medications  Dr Tam Guadalupe made aware of situation and stated to leave the NG tube out

## 2017-10-06 NOTE — ASSESSMENT & PLAN NOTE
Assessment: large hiatal hernia seen on chest CT with the majority of her stomach located in the thoracic region     Plan: conservative symptomatic management with no plan for surgical intervention at this time

## 2017-10-06 NOTE — PROGRESS NOTES
Entered patient room to administer medication, found patient clutching NG tube in her hands  I asked patient if she had removed the tube herself, she stated she had  I asked why, and the patient stated "you have tried to make me better and it is not working, now we are going to try this "   I asked patient if she understood the reason for having the NG tube placed, and she said she did, but she did not believe it was working, and we "cannot not tell her what to do "  Patient stated that she knew she was in the hospital, but disoriented to time and situation  Patient stated that "if I want to die, I can order you to let me die  I am the doctor "    General surgery paged, spoke with Dr Alice Martin  Doctor advised to attempt to replace NG tube and watch patient closely  Patient was approached second time about replacing the NG tube and refused

## 2017-10-06 NOTE — ASSESSMENT & PLAN NOTE
Assessment: Small bowel obstruction     Plan: At this point, the patient has discontinued her own NG tube and is not nauseated and has not been vomiting  She is a very poor historian but denies any abdominal pain  Her abdominal exam is completely benign this morning  Her obstruction series appears improved on my reading with gas in her colon  Interestingly, not much contrast in the colon but she had a liter of NG tube output, most of which was probably the contrast yesterday as it stayed in her stomach for an extended period of time  Patient has a history of dementia and is at baseline elected only to herself  This point she is refusing lab work, refuses replacement of the NG tube, and is refusing any medications that are offered by the nurses  Await final reading of the obstruction series from radiology  Discussed with Dr Ayaz Lyle  Plan to advance diet as tolerated today with aspiration precautions

## 2017-10-06 NOTE — SOCIAL WORK
Patient is confused; met with her nieces, Jay Jay Beauchamp and  Candis Rose  Patient lives at [facility]  Patient uses rollator to get to the dining room  Patient is very sedentary; will walk a short distance and then sit for long periods of time  She has help with showers; has had falls and does not have good balance  Family wishes for patient to return to [facility] for d/c needs

## 2017-10-06 NOTE — PLAN OF CARE
Problem: DISCHARGE PLANNING - CARE MANAGEMENT  Goal: Discharge to post-acute care or home with appropriate resources  INTERVENTIONS:  - Conduct assessment to determine patient/family and health care team treatment goals, and need for post-acute services based on payer coverage, community resources, and patient preferences, and barriers to discharge  - Address psychosocial, clinical, and financial barriers to discharge as identified in assessment in conjunction with the patient/family and health care team  - Arrange appropriate level of post-acute services according to patient's   needs and preference and payer coverage in collaboration with the physician and health care team  - Communicate with and update the patient/family, physician, and health care team regarding progress on the discharge plan  - Arrange appropriate transportation to post-acute venues  Outcome: Progressing  CM following for d/c needs; will assist with transport to return to [facility]

## 2017-10-06 NOTE — PROGRESS NOTES
Progress Note - Madalyn Mckeon 80 y o  female MRN: [id number]    Unit/Bed#: [place] 2 -01 Encounter: [id number]    Assessment/Plan:    Small bowel obstruction discussed with surgery and her POA      Conservative treatment with IV fluids NPO and activity      Patient refuses NG tube and POA agrees with decision      Monitor with surgery      Try to increased activity    Possible aspiration pneumonia continue cefepime and Flagyl    Nausea vomiting  related to small bowel obs will continue Zofran and Phenergan p r n  Hypertension   better control with IV metoprolol    Chronic kidney disease 3 appears at baseline creatinine 1 47    GERD    continue PPI for acid control    Holding Norvasc Lipitor Plavix Lasix Diovan while NPO    Subjective:   Still with abdominal discomfort nausea vomiting but had a bowel movement this a m  denies chest pain shortness of breath no fevers or chills no appetite    Objective:     Vitals: Blood pressure 149/67, pulse 75, temperature 100 °F (37 8 °C), temperature source Tympanic, resp  rate 18, height 5' (1 524 m), weight 49 7 kg (109 lb 9 1 oz), SpO2 95 %  ,Body mass index is 21 4 kg/m²          Results from last 7 days  Lab Units 10/05/17  0450   WBC Thousand/uL 8 05   HEMOGLOBIN g/dL 11 0*   HEMATOCRIT % 34 9   PLATELETS Thousands/uL 213       Results from last 7 days  Lab Units 10/05/17  0450   SODIUM mmol/L 140   POTASSIUM mmol/L 3 4*   CHLORIDE mmol/L 105   CO2 mmol/L 27   BUN mg/dL 29*   CREATININE mg/dL 1 46*   CALCIUM mg/dL 8 2*   TOTAL PROTEIN g/dL 6 0*   BILIRUBIN TOTAL mg/dL 0 35   ALK PHOS U/L 47   ALT U/L 9*   AST U/L 12   GLUCOSE RANDOM mg/dL 153*       Scheduled Meds:    cefepime 1,000 mg Intravenous Q24H   heparin (porcine) 5,000 Units Subcutaneous Q8H [address]   hydrALAZINE 10 mg Intravenous Once   metoprolol 2 5 mg Intravenous Q8H   metroNIDAZOLE 500 mg Intravenous Q8H   pantoprazole 40 mg Intravenous Daily       Continuous Infusions:    dextrose 5 % and sodium chloride 0 9 % with KCl 40 mEq/L 100 mL/hr Last Rate: 100 mL/hr (10/06/17 4197)     Physical exam:  General appearance:  Alert no distress poor interaction decreased hearing elderly  Head/Eyes:  Nonicteric PERRL EOMI  Neck:  Supple  Lungs:  Decreased BS bilateral no wheezing rhonchi or rales  Heart: normal S1 S2 regular  Abdomen:  Distended epigastric tenderness with decreased bowel sounds no R,R, G  Extremities: no edema  Skin: no rash    Invasive Devices     Peripheral Intravenous Line            Peripheral IV 10/04/17 Right Antecubital 1 day          Drain            NG/OG/Enteral Tube Nasogastric 14 Fr Left mouth 1 day                  VTE Pharmacologic Prophylaxis:  heparin   VTE Mechanical Prophylaxis:  heparin                     Counseling / Coordination of Care  Total floor / unit time spent today  30   minutes  Greater than 50% of total time was spent with the patient and / or family counseling and / or coordination of care    A description of the counseling / coordination of care:  Discussed with POA

## 2017-10-06 NOTE — TREATMENT PLAN
Treatment Plan - General Surgery   Patria Turner 80 y o  female MRN: [id number]    Spoke with Bhargavi Mir, reached by phone at [phone] this morning  Bhargavi Mir is the niece and power of  for Elisabeth Daria  I explained fully the situation relative to the small bowel obstruction versus ileus and the need for NG tube decompression  At this point, patient continues to refuse per recent note by Dr Earl García  Power of  and one other family member plan to come to the hospital later and have in-depth discussion including Dr Earl García if necessary about continuing intervention versus palliative care  We discussed likely clinical outcomes if NG tube is foregone and also what we can expect if surgery is required  POA is very thankful on the phone, verbalizes understanding  All questions answered  As with before, she remains a very high pneumonia risk if she should vomit with concern of worsening aspiration pneumonia in the presence of pneumonia already  Recommend that nursing continue to discuss with the patient the possibility of placing an NG tube but understand that power of  does not want restraints in order to be able to decompress her with an NG tube  Continue serial abdominal examination      Signature:   Gracia Kolb PA-C  Date: 10/6/2017 Time: 12:31 PM

## 2017-10-06 NOTE — PROGRESS NOTES
Progress Note - General Surgery   Hetal Man 80 y o  female MRN: [id number]  Unit/Bed#: [place] 2 -01 Encounter: [id number]        Hiatal hernia   Assessment & Plan       Assessment: large hiatal hernia seen on chest CT with the majority of her stomach located in the thoracic region     Plan: conservative symptomatic management with no plan for surgical intervention at this time  * Other partial intestinal obstruction   Assessment & Plan       Assessment: Small bowel obstruction     Plan: At this point, the patient has discontinued her own NG tube and is not nauseated and has not been vomiting  She is a very poor historian but denies any abdominal pain  Her abdominal exam is completely benign this morning  Her obstruction series appears improved on my reading with gas in her colon  Interestingly, not much contrast in the colon but she had a liter of NG tube output, most of which was probably the contrast yesterday as it stayed in her stomach for an extended period of time  Patient has a history of dementia and is at baseline elected only to herself  This point she is refusing lab work, refuses replacement of the NG tube, and is refusing any medications that are offered by the nurses  Await final reading of the obstruction series from radiology  Discussed with Dr Sharee Vaca  Plan to advance diet as tolerated today with aspiration precautions  Patient was noted to have a temperature of 100 point 6/90 and 100F this morning  However she has pneumonia currently being treated with cefepime and Flagyl by the internal medicine team ideally I would like to recheck a CBC and a lactate  Patient is refusing  Spoke with Dr Vahe Montero - will call niece and update her regarding the situation  Recommendation is for NG tube replacement due to high risk for aspiration pneumonia in the presence of ileus vs  SBO with pneumonia, likely from aspiration       Rivka Dey PA-C  Date: 10/6/2017 Time: 8:21 AM   Pager: [phone]    Subjective:  Patient reports a little bit of nausea but no vomiting  She denies any abdominal pain at the time of examination  She is unsure why she was in the hospital for what happened last night when she discontinued her own NG tube  Nursing report: Patient discontinued her own NG tube at1 AM this morning  Multiple attempts were made for replacing the NG tube which the patient adamantly refused  She also refused blood work and medications this morning  She did consent to obstruction series x-ray which was performed earlier this morning  No acute events or change in her vitals overnight no episodes of vomiting  No stool      Objective:    Vitals:   Vitals:    10/05/17 0306 10/05/17 0744 10/05/17 1500 10/05/17 2301   BP: 140/52 150/65 136/69 130/61   Pulse:  61 74 71   Resp:  17 16 18   Temp:  99 3 °F (37 4 °C) 98 8 °F (37 1 °C) (!) 100 6 °F (38 1 °C)   TempSrc:  Tympanic Tympanic Tympanic   SpO2:  96% 96% 94%   Weight:       Height:           I/O:   I/O       10/04 0701 - 10/05 0700 10/05 0701 - 10/06 0700 10/06 0701 - 10/07 0700    I V  (mL/kg) 1000 (20 1) 1306 3 (26 3)     IV Piggyback 500      Total Intake(mL/kg) 1500 (30 2) 1306 3 (26 3)     Emesis/NG output 250 1000     Total Output 250 1000      Net +1250 +306 3                   Labs:    Results from last 7 days  Lab Units 10/05/17  0450 10/04/17  1641   WBC Thousand/uL 8 05 5 48   HEMOGLOBIN g/dL 11 0* 13 9   HEMATOCRIT % 34 9 43 1   PLATELETS Thousands/uL 213 246   MONO PCT MAN % 5 6         Results from last 7 days  Lab Units 10/05/17  0450 10/04/17  1641   SODIUM mmol/L 140 142   POTASSIUM mmol/L 3 4* 4 3   CHLORIDE mmol/L 105 103   CO2 mmol/L 27 29   BUN mg/dL 29* 28*   CREATININE mg/dL 1 46* 1 59*   CALCIUM mg/dL 8 2* 9 4   TOTAL PROTEIN g/dL 6 0* 7 7   BILIRUBIN TOTAL mg/dL 0 35 0 50   ALK PHOS U/L 47 68   ALT U/L 9* 13   AST U/L 12 19   GLUCOSE RANDOM mg/dL 153* 153*                     Exam:  Abdomen: abdomen soft and nontender  Normal active bowel sounds on brief auscultation in all 4 quadrants  No visible surgical scars or hernias are noted  Patient appears very comfortable with deep palpation in all 4 quadrants  Some portions of this record may have been generated with voice recognition software  There may be translation, syntax,  or grammatical errors  Occasional wrong word or "sound-a-like" substitutions may have occurred due to the inherent limitations of the voice recognition software  Read the chart carefully and recognize, using context, where substations may have occurred  If you have any questions, please contact the dictating provider for clarification or correction, as needed         Arturo Kuhn PA-C  Date: 10/6/2017 Time: 8:21 AM   Pager: [phone]

## 2017-10-07 LAB
ANION GAP SERPL CALCULATED.3IONS-SCNC: 11 MMOL/L (ref 4–13)
BASOPHILS # BLD AUTO: 0.01 THOUSANDS/ΜL (ref 0–0.1)
BASOPHILS NFR BLD AUTO: 0 % (ref 0–1)
BUN SERPL-MCNC: 20 MG/DL (ref 5–25)
CALCIUM SERPL-MCNC: 7.5 MG/DL (ref 8.3–10.1)
CHLORIDE SERPL-SCNC: 112 MMOL/L (ref 100–108)
CO2 SERPL-SCNC: 19 MMOL/L (ref 21–32)
CREAT SERPL-MCNC: 1.26 MG/DL (ref 0.6–1.3)
EOSINOPHIL # BLD AUTO: 0.01 THOUSAND/ΜL (ref 0–0.61)
EOSINOPHIL NFR BLD AUTO: 0 % (ref 0–6)
ERYTHROCYTE [DISTWIDTH] IN BLOOD BY AUTOMATED COUNT: 14.8 % (ref 11.6–15.1)
GFR SERPL CREATININE-BSD FRML MDRD: 36 ML/MIN/1.73SQ M
GLUCOSE SERPL-MCNC: 159 MG/DL (ref 65–140)
HCT VFR BLD AUTO: 35.5 % (ref 34.8–46.1)
HGB BLD-MCNC: 11.2 G/DL (ref 11.5–15.4)
LYMPHOCYTES # BLD AUTO: 0.71 THOUSANDS/ΜL (ref 0.6–4.47)
LYMPHOCYTES NFR BLD AUTO: 10 % (ref 14–44)
MCH RBC QN AUTO: 30.3 PG (ref 26.8–34.3)
MCHC RBC AUTO-ENTMCNC: 31.5 G/DL (ref 31.4–37.4)
MCV RBC AUTO: 96 FL (ref 82–98)
MONOCYTES # BLD AUTO: 0.79 THOUSAND/ΜL (ref 0.17–1.22)
MONOCYTES NFR BLD AUTO: 11 % (ref 4–12)
NEUTROPHILS # BLD AUTO: 5.85 THOUSANDS/ΜL (ref 1.85–7.62)
NEUTS SEG NFR BLD AUTO: 79 % (ref 43–75)
NRBC BLD AUTO-RTO: 0 /100 WBCS
PLATELET # BLD AUTO: 201 THOUSANDS/UL (ref 149–390)
PMV BLD AUTO: 10.2 FL (ref 8.9–12.7)
POTASSIUM SERPL-SCNC: 4.3 MMOL/L (ref 3.5–5.3)
RBC # BLD AUTO: 3.7 MILLION/UL (ref 3.81–5.12)
SODIUM SERPL-SCNC: 142 MMOL/L (ref 136–145)
WBC # BLD AUTO: 7.37 THOUSAND/UL (ref 4.31–10.16)

## 2017-10-07 PROCEDURE — 85025 COMPLETE CBC W/AUTO DIFF WBC: CPT | Performed by: INTERNAL MEDICINE

## 2017-10-07 PROCEDURE — C9113 INJ PANTOPRAZOLE SODIUM, VIA: HCPCS | Performed by: SURGERY

## 2017-10-07 PROCEDURE — 80048 BASIC METABOLIC PNL TOTAL CA: CPT | Performed by: INTERNAL MEDICINE

## 2017-10-07 RX ADMIN — HEPARIN SODIUM 5000 UNITS: 5000 INJECTION, SOLUTION INTRAVENOUS; SUBCUTANEOUS at 05:35

## 2017-10-07 RX ADMIN — CEFEPIME HYDROCHLORIDE 1000 MG: 1 INJECTION, POWDER, FOR SOLUTION INTRAMUSCULAR; INTRAVENOUS at 03:15

## 2017-10-07 RX ADMIN — HEPARIN SODIUM 5000 UNITS: 5000 INJECTION, SOLUTION INTRAVENOUS; SUBCUTANEOUS at 13:24

## 2017-10-07 RX ADMIN — METRONIDAZOLE 500 MG: 500 INJECTION, SOLUTION INTRAVENOUS at 20:28

## 2017-10-07 RX ADMIN — POTASSIUM CHLORIDE, DEXTROSE MONOHYDRATE AND SODIUM CHLORIDE 75 ML/HR: 300; 5; 900 INJECTION, SOLUTION INTRAVENOUS at 12:36

## 2017-10-07 RX ADMIN — METRONIDAZOLE 500 MG: 500 INJECTION, SOLUTION INTRAVENOUS at 04:06

## 2017-10-07 RX ADMIN — HEPARIN SODIUM 5000 UNITS: 5000 INJECTION, SOLUTION INTRAVENOUS; SUBCUTANEOUS at 22:09

## 2017-10-07 RX ADMIN — METRONIDAZOLE 500 MG: 500 INJECTION, SOLUTION INTRAVENOUS at 11:30

## 2017-10-07 RX ADMIN — PANTOPRAZOLE SODIUM 40 MG: 40 INJECTION, POWDER, FOR SOLUTION INTRAVENOUS at 08:00

## 2017-10-07 RX ADMIN — METOPROLOL TARTRATE 2.5 MG: 5 INJECTION INTRAVENOUS at 00:24

## 2017-10-07 RX ADMIN — METOPROLOL TARTRATE 2.5 MG: 5 INJECTION INTRAVENOUS at 16:42

## 2017-10-07 RX ADMIN — METOPROLOL TARTRATE 2.5 MG: 5 INJECTION INTRAVENOUS at 23:18

## 2017-10-07 RX ADMIN — METOPROLOL TARTRATE 2.5 MG: 5 INJECTION INTRAVENOUS at 07:54

## 2017-10-07 NOTE — NUTRITION
10/07/17 1647   PES Statement   Problem Intake   Oral or Nutritional Support Intake (2) Inadequate oral intake NI-2 1   Related to GI distress/pain   As evidenced by: NPO/CL > 3 days   Patient Nutrition Goals   Goal tolerate PO diet   Goal Status initiated   Timeframe to complete goal by next f/u   Recommendations/Interventions   Summary Discussed with nieces the possibility of TPN, if no resolution of SBO  Please consult for recommendations  Interventions Diet: to Advance   Nutrition Recommendations Continue diet as ordered  (Discussed possibility of TPN, however, nieces are sure that pt's wishes do not include extraordinary measures )   Nutrition Complexity Risk   Nutrition complexity level High risk   Nutrition review: 10/10/17  (Diet advancement?   TPN orders?)   Follow up date 10/12/17

## 2017-10-07 NOTE — PROGRESS NOTES
Did not appreciate bowel sounds upon assessment; abdomen noted to be firm and distended; patient continuing to refused NG tube placement despite reinforcing importance; continuing to c/o nausea; medicated for nausea; did notify NP, though looking back to prior notes, surgery and family aware of impending situation and the likelihood of condition deteriorating; VS are stable at this time and no complaints of abdominal pain; will continue to monitor

## 2017-10-07 NOTE — PROGRESS NOTES
Patient's daughter called to check on her; updated her on patient's current condition and continued refusal to let the nursing staff place an NG tube; let her know that patient does not seem to be in any pain unless she is moved and is resting comfortably and her vital signs are stable at this time despite not being able to auscultate any bowel sounds; suggested that she speak to the doctor when she comes in today about whether surgery is something that she or her mom would want or whether she would just want to be comfortable

## 2017-10-08 ENCOUNTER — APPOINTMENT (INPATIENT)
Dept: RADIOLOGY | Facility: HOSPITAL | Age: 82
DRG: 388 | End: 2017-10-08
Payer: MEDICARE

## 2017-10-08 LAB
ANION GAP SERPL CALCULATED.3IONS-SCNC: 7 MMOL/L (ref 4–13)
BUN SERPL-MCNC: 14 MG/DL (ref 5–25)
CALCIUM SERPL-MCNC: 7.8 MG/DL (ref 8.3–10.1)
CHLORIDE SERPL-SCNC: 118 MMOL/L (ref 100–108)
CO2 SERPL-SCNC: 23 MMOL/L (ref 21–32)
CREAT SERPL-MCNC: 1.16 MG/DL (ref 0.6–1.3)
GFR SERPL CREATININE-BSD FRML MDRD: 40 ML/MIN/1.73SQ M
GLUCOSE SERPL-MCNC: 138 MG/DL (ref 65–140)
POTASSIUM SERPL-SCNC: 4.9 MMOL/L (ref 3.5–5.3)
SODIUM SERPL-SCNC: 148 MMOL/L (ref 136–145)

## 2017-10-08 PROCEDURE — 80048 BASIC METABOLIC PNL TOTAL CA: CPT | Performed by: INTERNAL MEDICINE

## 2017-10-08 PROCEDURE — 74022 RADEX COMPL AQT ABD SERIES: CPT

## 2017-10-08 PROCEDURE — C9113 INJ PANTOPRAZOLE SODIUM, VIA: HCPCS | Performed by: SURGERY

## 2017-10-08 RX ADMIN — METRONIDAZOLE 500 MG: 500 INJECTION, SOLUTION INTRAVENOUS at 20:33

## 2017-10-08 RX ADMIN — METOPROLOL TARTRATE 2.5 MG: 5 INJECTION INTRAVENOUS at 17:28

## 2017-10-08 RX ADMIN — METOPROLOL TARTRATE 2.5 MG: 5 INJECTION INTRAVENOUS at 08:05

## 2017-10-08 RX ADMIN — POTASSIUM CHLORIDE, DEXTROSE MONOHYDRATE AND SODIUM CHLORIDE 75 ML/HR: 300; 5; 900 INJECTION, SOLUTION INTRAVENOUS at 01:24

## 2017-10-08 RX ADMIN — METRONIDAZOLE 500 MG: 500 INJECTION, SOLUTION INTRAVENOUS at 03:53

## 2017-10-08 RX ADMIN — HEPARIN SODIUM 5000 UNITS: 5000 INJECTION, SOLUTION INTRAVENOUS; SUBCUTANEOUS at 21:59

## 2017-10-08 RX ADMIN — CEFEPIME HYDROCHLORIDE 1000 MG: 1 INJECTION, POWDER, FOR SOLUTION INTRAMUSCULAR; INTRAVENOUS at 03:12

## 2017-10-08 RX ADMIN — POTASSIUM CHLORIDE, DEXTROSE MONOHYDRATE AND SODIUM CHLORIDE 50 ML/HR: 300; 5; 900 INJECTION, SOLUTION INTRAVENOUS at 17:27

## 2017-10-08 RX ADMIN — PANTOPRAZOLE SODIUM 40 MG: 40 INJECTION, POWDER, FOR SOLUTION INTRAVENOUS at 08:05

## 2017-10-08 RX ADMIN — METRONIDAZOLE 500 MG: 500 INJECTION, SOLUTION INTRAVENOUS at 12:31

## 2017-10-08 RX ADMIN — HEPARIN SODIUM 5000 UNITS: 5000 INJECTION, SOLUTION INTRAVENOUS; SUBCUTANEOUS at 06:15

## 2017-10-08 RX ADMIN — HEPARIN SODIUM 5000 UNITS: 5000 INJECTION, SOLUTION INTRAVENOUS; SUBCUTANEOUS at 14:01

## 2017-10-08 NOTE — PROGRESS NOTES
[facility] Internal [facility] Progress Note  Patient: Santiago Granado 80 y o  female   MRN: [id number]  PCP: Meño Larkin DO  Unit/Bed#: [place] [facility] [id number] Encounter: [id number]  Date Of Visit: 10/08/17      Assessment/plan  Principal problem  1  Small bowel obstruction- treating conservatively  Pt refused ngt  Slight improvement  Surgery is starting ice chips  Awaiting surgery comment on repeat obstructions series  2  Mild vascular congestion- decrease IVF  Continue to monitor     Active problems  1  Possible aspiration pneumonia-continue cefepime and Flagyl day 4/7     2  Hypertension- continue IV metoprolol      3  Chronic kidney disease 3- at baseline     4  GERD- continue ppi    dispo- daughter at bedside    Subjective:   Pt seen and examined  Pt is less confused today  She is denying abd pain  No n/v  No other problems  She is tolerating ice chips  She told her daughter that it felt like heaven to have the ice chips  Objective:     Vitals: Blood pressure 142/58, pulse 71, temperature 97 5 °F (36 4 °C), temperature source Tympanic, resp  rate 18, height 5' (1 524 m), weight 49 7 kg (109 lb 9 1 oz), SpO2 96 %  ,Body mass index is 21 4 kg/m²  Lab, Imaging and other studies:    Results from last 7 days  Lab Units 10/07/17  0537   WBC Thousand/uL 7 37   HEMOGLOBIN g/dL 11 2*   HEMATOCRIT % 35 5   PLATELETS Thousands/uL 201       Results from last 7 days  Lab Units 10/08/17  0629  10/05/17  0450   SODIUM mmol/L 148*  < > 140   POTASSIUM mmol/L 4 9  < > 3 4*   CHLORIDE mmol/L 118*  < > 105   CO2 mmol/L 23  < > 27   BUN mg/dL 14  < > 29*   CREATININE mg/dL 1 16  < > 1 46*   CALCIUM mg/dL 7 8*  < > 8 2*   TOTAL PROTEIN g/dL  --   --  6 0*   BILIRUBIN TOTAL mg/dL  --   --  0 35   ALK PHOS U/L  --   --  47   ALT U/L  --   --  9*   AST U/L  --   --  12   GLUCOSE RANDOM mg/dL 138  < > 153*   < > = values in this interval not displayed  Lab Results   Component Value Date    URINECX Culture results to follow  09/07/2017    URINECX >100,000 cfu/ml Pseudomonas aeruginosa 09/07/2017    URINECX 50,000-59,000 cfu/ml Escherichia coli 09/07/2017     Xr Abdomen Obstruction Series    Result Date: 10/8/2017  Narrative: OBSTRUCTION SERIES INDICATION:  Abdominal pain  Small bowel obstruction  COMPARISON: 10/6/2017 VIEWS:  (Supine, decubitus abdomen and upright chest) IMAGES:  5 FINDINGS: Stable mildly dilated small bowel loops in the midabdomen  No free air beneath the hemidiaphragms  No pathologic calcifications or soft tissue masses evident  Osseous structures are unremarkable  Examination of the chest reveals a stable cardiomediastinal silhouette with hiatal hernia  Mild vascular congestion and left effusion  Impression: Stable small bowel dilatation  Partial obstruction versus ileus  Interval development of mild vascular congestion and left effusion  Stable hiatal hernia  ##[email or web address]##[email or web address] Workstation performed: [id number]         Physical exam:  Physical Exam  General appearance: alert, appears stated age and cooperative  Head: Normocephalic, without obvious abnormality, atraumatic  Eyes: conjunctivae/corneas clear  PERRL, EOM's intact  Fundi benign    Neck: no adenopathy, no carotid bruit, no JVD, supple, symmetrical, trachea midline and thyroid not enlarged, symmetric, no tenderness/mass/nodules  Lungs: decrease breath sounds at bases bilateral  Heart: regular rate and rhythm, S1, S2 normal, no murmur, click, rub or gallop  Abdomen: soft slight distention nt decrease bs  Extremities: extremities normal, atraumatic, no cyanosis or edema  Pulses: 2+ and symmetric  Skin: Skin color, texture, turgor normal  No rashes or lesions  Neurologic: awake alert oriented x3 with slight confusion

## 2017-10-08 NOTE — PROGRESS NOTES
Progress Note - General Surgery   Haylie Alt 80 y o  female MRN: [id number]  Unit/Bed#: [place] [facility] 2 -01 Encounter: [id number]    Assessment:  SBO  Severe protein calorie malnutrition      Plan:  SBO - patient clinically feels improved although OBS only mildly improved, will start sips of clears, and attempt to have patient OOB, if patient not improving will need to consider intervention vs hospice  Severe protein calorie malnutrition - will check albumin and prealbumin in AM if patient does not start po soon will and family wishes to proceed with need to consider TPN    Subjective/Objective   Chief Complaint: Passed gas    Subjective: patient states feeling ok and passed some gas but no BM, states hasnt gotten up today or yesterday    Objective:     Blood pressure 142/58, pulse 71, temperature 97 5 °F (36 4 °C), temperature source Tympanic, resp  rate 18, height 5' (1 524 m), weight 49 7 kg (109 lb 9 1 oz), SpO2 96 %  ,Body mass index is 21 4 kg/m²  Intake/Output Summary (Last 24 hours) at 10/08/17 0956  Last data filed at 10/07/17 1236   Gross per 24 hour   Intake           508 75 ml   Output                0 ml   Net           508 75 ml       Invasive Devices     Peripheral Intravenous Line            Peripheral IV 10/08/17 Left Antecubital less than 1 day          Drain            NG/OG/Enteral Tube Nasogastric 14 Fr Left mouth 3 days                Physical Exam: soft +BS less distended less tympanic NT    Lab, Imaging and other studies:  I have personally reviewed pertinent lab results    , CBC: No results found for: WBC, HGB, HCT, MCV, PLT, ADJUSTEDWBC, MCH, MCHC, RDW, MPV, NRBC, CMP:   Lab Results   Component Value Date     (H) 10/08/2017    K 4 9 10/08/2017     (H) 10/08/2017    CO2 23 10/08/2017    ANIONGAP 7 10/08/2017    BUN 14 10/08/2017    CREATININE 1 16 10/08/2017    GLUCOSE 138 10/08/2017    CALCIUM 7 8 (L) 10/08/2017    EGFR 40 10/08/2017   , Coagulation: No results found for: PT, INR, APTT, Urinalysis: No results found for: COLORU, CLARITYU, SPECGRAV, PHUR, LEUKOCYTESUR, NITRITE, PROTEINUA, GLUCOSEU, KETONESU, BILIRUBINUR, BLOODU, Amylase: No results found for: AMYLASE, Lipase: No results found for: LIPASE  VTE Pharmacologic Prophylaxis: Heparin  VTE Mechanical Prophylaxis: sequential compression device

## 2017-10-08 NOTE — PROGRESS NOTES
Seen and examined no acute events unchanged no bm or flatus  AVSS afebrile  Soft decreased bs distension TTP mild in periumbilical region tympanic   SBO - pt refuses ngt, will check OBS in AM,if not improved and patient still refusing NGT will consider palliative care

## 2017-10-08 NOTE — PLAN OF CARE
DISCHARGE PLANNING     Discharge to home or other facility with appropriate resources Progressing        DISCHARGE PLANNING - CARE MANAGEMENT     Discharge to post-acute care or home with appropriate resources Progressing        GASTROINTESTINAL - ADULT     Minimal or absence of nausea and/or vomiting Progressing     Maintains or returns to baseline bowel function Progressing     Maintains adequate nutritional intake Progressing        INFECTION - ADULT     Absence or prevention of progression during hospitalization Progressing     Absence of fever/infection during neutropenic period Progressing        Knowledge Deficit     Patient/family/caregiver demonstrates understanding of disease process, treatment plan, medications, and discharge instructions Progressing        Nutrition/Hydration-ADULT     Nutrient/Hydration intake appropriate for improving, restoring or maintaining nutritional needs Progressing        PAIN - ADULT     Verbalizes/displays adequate comfort level or baseline comfort level Progressing        Potential for Falls     Patient will remain free of falls Progressing        Prexisting or High Potential for Compromised Skin Integrity     Skin integrity is maintained or improved Progressing        SAFETY ADULT     Maintain or return to baseline ADL function Progressing     Maintain or return mobility status to optimal level Progressing

## 2017-10-08 NOTE — PLAN OF CARE
Problem: Potential for Falls  Goal: Patient will remain free of falls  INTERVENTIONS:  - Assess patient frequently for physical needs  -  Identify cognitive and physical deficits and behaviors that affect risk of falls    -  [place] fall precautions as indicated by assessment   - Educate patient/family on patient safety including physical limitations  - Instruct patient to call for assistance with activity based on assessment  - Modify environment to reduce risk of injury  - Consider OT/PT consult to assist with strengthening/mobility   Outcome: Progressing      Problem: Prexisting or High Potential for Compromised Skin Integrity  Goal: Skin integrity is maintained or improved  INTERVENTIONS:  - Identify patients at risk for skin breakdown  - Assess and monitor skin integrity  - Assess and monitor nutrition and hydration status  - Monitor labs (i e  albumin)  - Assess for incontinence   - Turn and reposition patient  - Assist with mobility/ambulation  - Relieve pressure over bony prominences  - Avoid friction and shearing  - Provide appropriate hygiene as needed including keeping skin clean and dry  - Evaluate need for skin moisturizer/barrier cream  - Collaborate with interdisciplinary team (i e  Nutrition, Rehabilitation, etc )   - Patient/family teaching   Outcome: Progressing      Problem: PAIN - ADULT  Goal: Verbalizes/displays adequate comfort level or baseline comfort level  Interventions:  - Encourage patient to monitor pain and request assistance  - Assess pain using appropriate pain scale  - Administer analgesics based on type and severity of pain and evaluate response  - Implement non-pharmacological measures as appropriate and evaluate response  - Consider cultural and social influences on pain and pain management  - Notify physician/advanced practitioner if interventions unsuccessful or patient reports new pain   Outcome: Progressing      Problem: INFECTION - ADULT  Goal: Absence or prevention of progression during hospitalization  INTERVENTIONS:  - Assess and monitor for signs and symptoms of infection  - Monitor lab/diagnostic results  - Monitor all insertion sites, i e  indwelling lines, tubes, and drains  - Monitor endotracheal (as able) and nasal secretions for changes in amount and color  - [place] appropriate cooling/warming therapies per order  - Administer medications as ordered  - Instruct and encourage patient and family to use good hand hygiene technique  - Identify and instruct in appropriate isolation precautions for identified infection/condition   Outcome: Progressing    Goal: Absence of fever/infection during neutropenic period  INTERVENTIONS:  - Monitor WBC  - Implement neutropenic guidelines   Outcome: Progressing      Problem: SAFETY ADULT  Goal: Maintain or return to baseline ADL function  INTERVENTIONS:  -  Assess patient's ability to carry out ADLs; assess patient's baseline for ADL function and identify physical deficits which impact ability to perform ADLs (bathing, care of mouth/teeth, toileting, grooming, dressing, etc )  - Assess/evaluate cause of self-care deficits   - Assess range of motion  - Assess patient's mobility; develop plan if impaired  - Assess patient's need for assistive devices and provide as appropriate  - Encourage maximum independence but intervene and supervise when necessary  ¯ Involve family in performance of ADLs  ¯ Assess for home care needs following discharge   ¯ Request OT consult to assist with ADL evaluation and planning for discharge  ¯ Provide patient education as appropriate   Outcome: Progressing    Goal: Maintain or return mobility status to optimal level  INTERVENTIONS:  - Assess patient's baseline mobility status (ambulation, transfers, stairs, etc )    - Identify cognitive and physical deficits and behaviors that affect mobility  - Identify mobility aids required to assist with transfers and/or ambulation (gait belt, sit-to-stand, lift, walker, cane, etc )  - [place] fall precautions as indicated by assessment  - Record patient progress and toleration of activity level on Mobility SBAR; progress patient to next Phase/Stage  - Instruct patient to call for assistance with activity based on assessment  - Request Rehabilitation consult to assist with strengthening/weightbearing, etc    Outcome: Progressing      Problem: DISCHARGE PLANNING  Goal: Discharge to home or other facility with appropriate resources  INTERVENTIONS:  - Identify barriers to discharge w/patient and caregiver  - Arrange for needed discharge resources and transportation as appropriate  - Identify discharge learning needs (meds, wound care, etc )  - Arrange for interpretive services to assist at discharge as needed  - Refer to Case Management Department for coordinating discharge planning if the patient needs post-hospital services based on physician/advanced practitioner order or complex needs related to functional status, cognitive ability, or social support system   Outcome: Progressing      Problem: Knowledge Deficit  Goal: Patient/family/caregiver demonstrates understanding of disease process, treatment plan, medications, and discharge instructions  Complete learning assessment and assess knowledge base    Interventions:  - Provide teaching at level of understanding  - Provide teaching via preferred learning methods   Outcome: Progressing      Problem: GASTROINTESTINAL - ADULT  Goal: Minimal or absence of nausea and/or vomiting  INTERVENTIONS:  - Administer IV fluids as ordered to ensure adequate hydration  - Maintain NPO status until nausea and vomiting are resolved  - Nasogastric tube as ordered  - Administer ordered antiemetic medications as needed  - Provide nonpharmacologic comfort measures as appropriate  - Advance diet as tolerated, if ordered  - Nutrition services referral to assist patient with adequate nutrition and appropriate food choices   Outcome: Progressing    Goal: Maintains or returns to baseline bowel function  INTERVENTIONS:  - Assess bowel function  - Encourage oral fluids to ensure adequate hydration  - Administer IV fluids as ordered to ensure adequate hydration  - Administer ordered medications as needed  - Encourage mobilization and activity  - Nutrition services referral to assist patient with appropriate food choices   Outcome: Progressing    Goal: Maintains adequate nutritional intake  INTERVENTIONS:  - Monitor percentage of each meal consumed  - Identify factors contributing to decreased intake, treat as appropriate  - Assist with meals as needed  - Monitor I&O, WT and lab values  - Obtain nutrition services referral as needed   Outcome: Progressing      Problem: DISCHARGE PLANNING - CARE MANAGEMENT  Goal: Discharge to post-acute care or home with appropriate resources  INTERVENTIONS:  - Conduct assessment to determine patient/family and health care team treatment goals, and need for post-acute services based on payer coverage, community resources, and patient preferences, and barriers to discharge  - Address psychosocial, clinical, and financial barriers to discharge as identified in assessment in conjunction with the patient/family and health care team  - Arrange appropriate level of post-acute services according to patient's   needs and preference and payer coverage in collaboration with the physician and health care team  - Communicate with and update the patient/family, physician, and health care team regarding progress on the discharge plan  - Arrange appropriate transportation to post-acute venues   Outcome: Progressing      Problem: Nutrition/Hydration-ADULT  Goal: Nutrient/Hydration intake appropriate for improving, restoring or maintaining nutritional needs  Monitor and assess patient's nutrition/hydration status for malnutrition (ex- brittle hair, bruises, dry skin, pale skin and conjunctiva, muscle wasting, smooth red tongue, and disorientation)  Collaborate with interdisciplinary team and initiate plan and interventions as ordered  Monitor patient's weight and dietary intake as ordered or per policy  Utilize nutrition screening tool and intervene per policy  Determine patient's food preferences and provide high-protein, high-caloric foods as appropriate       INTERVENTIONS:  - Monitor oral intake, urinary output, labs, and treatment plans  - Assess nutrition and hydration status and recommend course of action  - Evaluate amount of meals eaten  - Assist patient with eating if necessary   - Allow adequate time for meals  - Recommend/ encourage appropriate diets, oral nutritional supplements, and vitamin/mineral supplements  - Order, calculate, and assess calorie counts as needed  - Recommend, monitor, and adjust tube feedings and TPN/PPN based on assessed needs  - Assess need for intravenous fluids  - Provide specific nutrition/hydration education as appropriate  - Include patient/family/caregiver in decisions related to nutrition   Outcome: Progressing

## 2017-10-09 LAB
ALBUMIN SERPL BCP-MCNC: 1.8 G/DL (ref 3.5–5)
ALP SERPL-CCNC: 44 U/L (ref 46–116)
ALT SERPL W P-5'-P-CCNC: <6 U/L (ref 12–78)
ANION GAP SERPL CALCULATED.3IONS-SCNC: 7 MMOL/L (ref 4–13)
AST SERPL W P-5'-P-CCNC: 10 U/L (ref 5–45)
ATRIAL RATE: 76 BPM
ATRIAL RATE: 77 BPM
BASOPHILS # BLD MANUAL: 0 THOUSAND/UL (ref 0–0.1)
BASOPHILS NFR MAR MANUAL: 0 % (ref 0–1)
BILIRUB SERPL-MCNC: 0.21 MG/DL (ref 0.2–1)
BUN SERPL-MCNC: 13 MG/DL (ref 5–25)
BURR CELLS BLD QL SMEAR: PRESENT
CALCIUM SERPL-MCNC: 7.9 MG/DL (ref 8.3–10.1)
CHLORIDE SERPL-SCNC: 120 MMOL/L (ref 100–108)
CO2 SERPL-SCNC: 21 MMOL/L (ref 21–32)
CREAT SERPL-MCNC: 1.1 MG/DL (ref 0.6–1.3)
EOSINOPHIL # BLD MANUAL: 0.09 THOUSAND/UL (ref 0–0.4)
EOSINOPHIL NFR BLD MANUAL: 1 % (ref 0–6)
ERYTHROCYTE [DISTWIDTH] IN BLOOD BY AUTOMATED COUNT: 15.7 % (ref 11.6–15.1)
GFR SERPL CREATININE-BSD FRML MDRD: 42 ML/MIN/1.73SQ M
GLUCOSE SERPL-MCNC: 148 MG/DL (ref 65–140)
HCT VFR BLD AUTO: 34.1 % (ref 34.8–46.1)
HGB BLD-MCNC: 10.5 G/DL (ref 11.5–15.4)
LYMPHOCYTES # BLD AUTO: 1.46 THOUSAND/UL (ref 0.6–4.47)
LYMPHOCYTES # BLD AUTO: 16 % (ref 14–44)
MAGNESIUM SERPL-MCNC: 1.3 MG/DL (ref 1.6–2.6)
MCH RBC QN AUTO: 29.9 PG (ref 26.8–34.3)
MCHC RBC AUTO-ENTMCNC: 30.8 G/DL (ref 31.4–37.4)
MCV RBC AUTO: 97 FL (ref 82–98)
MONOCYTES # BLD AUTO: 0.37 THOUSAND/UL (ref 0–1.22)
MONOCYTES NFR BLD: 4 % (ref 4–12)
MYELOCYTES NFR BLD MANUAL: 5 % (ref 0–1)
NEUTROPHILS # BLD MANUAL: 6.76 THOUSAND/UL (ref 1.85–7.62)
NEUTS BAND NFR BLD MANUAL: 3 % (ref 0–8)
NEUTS SEG NFR BLD AUTO: 71 % (ref 43–75)
NRBC BLD AUTO-RTO: 0 /100 WBCS
P AXIS: 30 DEGREES
P AXIS: 37 DEGREES
PHOSPHATE SERPL-MCNC: 1.6 MG/DL (ref 2.3–4.1)
PLATELET # BLD AUTO: 182 THOUSANDS/UL (ref 149–390)
PLATELET BLD QL SMEAR: ADEQUATE
PMV BLD AUTO: 10.8 FL (ref 8.9–12.7)
POTASSIUM SERPL-SCNC: 4.8 MMOL/L (ref 3.5–5.3)
PR INTERVAL: 142 MS
PR INTERVAL: 144 MS
PREALB SERPL-MCNC: 8.2 MG/DL (ref 18–40)
PROT SERPL-MCNC: 4.9 G/DL (ref 6.4–8.2)
QRS AXIS: -36 DEGREES
QRS AXIS: -48 DEGREES
QRSD INTERVAL: 72 MS
QRSD INTERVAL: 74 MS
QT INTERVAL: 368 MS
QT INTERVAL: 388 MS
QTC INTERVAL: 416 MS
QTC INTERVAL: 436 MS
RBC # BLD AUTO: 3.51 MILLION/UL (ref 3.81–5.12)
SODIUM SERPL-SCNC: 148 MMOL/L (ref 136–145)
T WAVE AXIS: 31 DEGREES
T WAVE AXIS: 98 DEGREES
TOTAL CELLS COUNTED SPEC: 100
VENTRICULAR RATE: 76 BPM
VENTRICULAR RATE: 77 BPM
WBC # BLD AUTO: 9.13 THOUSAND/UL (ref 4.31–10.16)

## 2017-10-09 PROCEDURE — 97163 PT EVAL HIGH COMPLEX 45 MIN: CPT

## 2017-10-09 PROCEDURE — G8979 MOBILITY GOAL STATUS: HCPCS

## 2017-10-09 PROCEDURE — 83735 ASSAY OF MAGNESIUM: CPT | Performed by: SURGERY

## 2017-10-09 PROCEDURE — 84100 ASSAY OF PHOSPHORUS: CPT | Performed by: SURGERY

## 2017-10-09 PROCEDURE — 84134 ASSAY OF PREALBUMIN: CPT | Performed by: SURGERY

## 2017-10-09 PROCEDURE — G8978 MOBILITY CURRENT STATUS: HCPCS

## 2017-10-09 PROCEDURE — 85007 BL SMEAR W/DIFF WBC COUNT: CPT | Performed by: SURGERY

## 2017-10-09 PROCEDURE — 85027 COMPLETE CBC AUTOMATED: CPT | Performed by: SURGERY

## 2017-10-09 PROCEDURE — C9113 INJ PANTOPRAZOLE SODIUM, VIA: HCPCS | Performed by: SURGERY

## 2017-10-09 PROCEDURE — 80053 COMPREHEN METABOLIC PANEL: CPT | Performed by: SURGERY

## 2017-10-09 RX ORDER — DEXTROSE MONOHYDRATE 50 MG/ML
50 INJECTION, SOLUTION INTRAVENOUS CONTINUOUS
Status: DISCONTINUED | OUTPATIENT
Start: 2017-10-09 | End: 2017-10-10

## 2017-10-09 RX ORDER — MAGNESIUM SULFATE HEPTAHYDRATE 40 MG/ML
2 INJECTION, SOLUTION INTRAVENOUS ONCE
Status: COMPLETED | OUTPATIENT
Start: 2017-10-09 | End: 2017-10-13

## 2017-10-09 RX ADMIN — METRONIDAZOLE 500 MG: 500 INJECTION, SOLUTION INTRAVENOUS at 03:18

## 2017-10-09 RX ADMIN — METRONIDAZOLE 500 MG: 500 INJECTION, SOLUTION INTRAVENOUS at 11:56

## 2017-10-09 RX ADMIN — POTASSIUM PHOSPHATE, MONOBASIC AND POTASSIUM PHOSPHATE, DIBASIC 21 MMOL: 224; 236 INJECTION, SOLUTION INTRAVENOUS at 14:41

## 2017-10-09 RX ADMIN — MAGNESIUM SULFATE HEPTAHYDRATE 2 G: 40 INJECTION, SOLUTION INTRAVENOUS at 12:45

## 2017-10-09 RX ADMIN — METOPROLOL TARTRATE 2.5 MG: 5 INJECTION INTRAVENOUS at 00:44

## 2017-10-09 RX ADMIN — METOPROLOL TARTRATE 2.5 MG: 5 INJECTION INTRAVENOUS at 15:26

## 2017-10-09 RX ADMIN — METRONIDAZOLE 500 MG: 500 INJECTION, SOLUTION INTRAVENOUS at 19:55

## 2017-10-09 RX ADMIN — HEPARIN SODIUM 5000 UNITS: 5000 INJECTION, SOLUTION INTRAVENOUS; SUBCUTANEOUS at 22:35

## 2017-10-09 RX ADMIN — METOPROLOL TARTRATE 2.5 MG: 5 INJECTION INTRAVENOUS at 08:04

## 2017-10-09 RX ADMIN — CEFEPIME HYDROCHLORIDE 1000 MG: 1 INJECTION, POWDER, FOR SOLUTION INTRAMUSCULAR; INTRAVENOUS at 03:51

## 2017-10-09 RX ADMIN — DEXTROSE 50 ML/HR: 5 SOLUTION INTRAVENOUS at 11:52

## 2017-10-09 RX ADMIN — HEPARIN SODIUM 5000 UNITS: 5000 INJECTION, SOLUTION INTRAVENOUS; SUBCUTANEOUS at 14:03

## 2017-10-09 RX ADMIN — PANTOPRAZOLE SODIUM 40 MG: 40 INJECTION, POWDER, FOR SOLUTION INTRAVENOUS at 08:05

## 2017-10-09 NOTE — PROGRESS NOTES
H&P- Akila Manzano 80 y o  female MRN: [id number]    Unit/Bed#: [id number] Encounter: [id number]      SUBJECTIVE    Pt states she " sometimes feels worse and very tired", "I feel worse because I do not remember what happened to me couple days ago"  Per pt, "some cough", "some chest pain", "some abd pain", no BM for couple of days, "not passing gas a few days"  ROS:  (+) chest pain, cough, abd pain  (-) No Bm, no gas passing  No nausea/vomiting  All other systems reviewed and otherwise negative  OBJECTIVE  Vitals:   Vitals:    10/08/17 0700 10/08/17 1551 10/08/17 2357 10/09/17 0716   BP: 142/58 168/79 151/81 (!) 171/77   Pulse: 71 84 67 85   Resp: 18 18 18 18   Temp: 97 5 °F (36 4 °C) (!) 96 4 °F (35 8 °C) (!) 96 5 °F (35 8 °C) (!) 96 7 °F (35 9 °C)   TempSrc: Tympanic Tympanic Tympanic Tympanic   SpO2: 96% 96% 98% 92%   Weight:       Height:             Intake/Output Summary (Last 24 hours) at 10/09/17 6136  Last data filed at 10/08/17 1727   Gross per 24 hour   Intake          2012 08 ml   Output                1 ml   Net          2011 08 ml       Invasive Devices     Peripheral Intravenous Line            Peripheral IV 10/08/17 Left Antecubital 1 day          Drain            NG/OG/Enteral Tube Nasogastric 14 Fr Left mouth 4 days                Physical Exam   Constitutional: She appears distressed  HENT:   Mouth/Throat: No oropharyngeal exudate  Oral mucosa dry   Eyes: Pupils are equal, round, and reactive to light  No scleral icterus  Neck: Neck supple  Cardiovascular: Normal rate and regular rhythm  No murmur heard  Pulmonary/Chest: Effort normal  No respiratory distress  She has no wheezes  She has rales  Abdominal: Soft  She exhibits no distension  There is tenderness  There is no rebound and no guarding  Generalized mild tenderness  BS decreased   Musculoskeletal: She exhibits no edema  Neurological: She is alert  Skin: Skin is warm  She is not diaphoretic  Psychiatric:   Confused at her baseline        Lab:  I have personally reviewed pertinent reports      Admission on 10/04/2017   Component Date Value    WBC 10/04/2017 5 48     RBC 10/04/2017 4 48     Hemoglobin 10/04/2017 13 9     Hematocrit 10/04/2017 43 1     MCV 10/04/2017 96     MCH 10/04/2017 31 0     MCHC 10/04/2017 32 3     RDW 10/04/2017 14 9     MPV 10/04/2017 10 2     Platelets 60/34/8042 246     nRBC 10/04/2017 0     Sodium 10/04/2017 142     Potassium 10/04/2017 4 3     Chloride 10/04/2017 103     CO2 10/04/2017 29     Anion Gap 10/04/2017 10     BUN 10/04/2017 28*    Creatinine 10/04/2017 1 59*    Glucose 10/04/2017 153*    Calcium 10/04/2017 9 4     AST 10/04/2017 19     ALT 10/04/2017 13     Alkaline Phosphatase 10/04/2017 68     Total Protein 10/04/2017 7 7     Albumin 10/04/2017 3 1*    Total Bilirubin 10/04/2017 0 50     eGFR 10/04/2017 27     Lipase 10/04/2017 41*    LACTIC ACID 10/04/2017 1 7     Segmented % 10/04/2017 23*    Bands % 10/04/2017 47*    Lymphocytes % 10/04/2017 21     Monocytes % 10/04/2017 6     Eosinophils % 10/04/2017 0     Basophils % 10/04/2017 0     Metamyelocytes% 10/04/2017 3*    Absolute Neutrophils 10/04/2017 3 84     Lymphocytes Absolute 10/04/2017 1 15     Monocytes Absolute 10/04/2017 0 33     Eosinophils Absolute 10/04/2017 0 00     Basophils Absolute 10/04/2017 0 00     Total Counted 10/04/2017 100     Dohle Bodies 10/04/2017 Present     Toxic Vacuolated Neutrop* 10/04/2017 Present     Anisocytosis 10/04/2017 Present     Platelet Estimate 58/21/5096 Adequate     WBC 10/05/2017 8 05     RBC 10/05/2017 3 62*    Hemoglobin 10/05/2017 11 0*    Hematocrit 10/05/2017 34 9     MCV 10/05/2017 96     MCH 10/05/2017 30 4     MCHC 10/05/2017 31 5     RDW 10/05/2017 14 9     MPV 10/05/2017 10 5     Platelets 28/01/9815 213     nRBC 10/05/2017 0     Sodium 10/05/2017 140     Potassium 10/05/2017 3 4*    Chloride 10/05/2017 105     CO2 10/05/2017 27     Anion Gap 10/05/2017 8     BUN 10/05/2017 29*    Creatinine 10/05/2017 1 46*    Glucose 10/05/2017 153*    Calcium 10/05/2017 8 2*    AST 10/05/2017 12     ALT 10/05/2017 9*    Alkaline Phosphatase 10/05/2017 47     Total Protein 10/05/2017 6 0*    Albumin 10/05/2017 2 4*    Total Bilirubin 10/05/2017 0 35     eGFR 10/05/2017 30     Segmented % 10/05/2017 24*    Bands % 10/05/2017 52*    Lymphocytes % 10/05/2017 15     Monocytes % 10/05/2017 5     Eosinophils % 10/05/2017 0     Basophils % 10/05/2017 0     Metamyelocytes% 10/05/2017 3*    Myelocytes % 10/05/2017 1     Absolute Neutrophils 10/05/2017 6 12     Lymphocytes Absolute 10/05/2017 1 21     Monocytes Absolute 10/05/2017 0 40     Eosinophils Absolute 10/05/2017 0 00     Basophils Absolute 10/05/2017 0 00     Total Counted 10/05/2017 100     Dohle Bodies 10/05/2017 Present     RBC Morphology 10/05/2017 Normal     Platelet Estimate 23/72/2155 Adequate     Large Platelet 60/24/8276 Present     LACTIC ACID 10/05/2017 1 8     Ventricular Rate 10/09/2017 77     Atrial Rate 10/09/2017 77     MO Interval 10/09/2017 142     QRSD Interval 10/09/2017 72     QT Interval 10/09/2017 368     QTC Interval 10/09/2017 416     P Axis 10/09/2017 30     QRS Axis 10/09/2017 -36     T Wave Axis 10/09/2017 31     WBC 10/07/2017 7 37     RBC 10/07/2017 3 70*    Hemoglobin 10/07/2017 11 2*    Hematocrit 10/07/2017 35 5     MCV 10/07/2017 96     MCH 10/07/2017 30 3     MCHC 10/07/2017 31 5     RDW 10/07/2017 14 8     MPV 10/07/2017 10 2     Platelets 66/72/9689 201     nRBC 10/07/2017 0     Neutrophils Relative 10/07/2017 79*    Lymphocytes Relative 10/07/2017 10*    Monocytes Relative 10/07/2017 11     Eosinophils Relative 10/07/2017 0     Basophils Relative 10/07/2017 0     Neutrophils Absolute 10/07/2017 5 85     Lymphocytes Absolute 10/07/2017 0 71     Monocytes Absolute 10/07/2017 0 79     Eosinophils Absolute 10/07/2017 0 01     Basophils Absolute 10/07/2017 0 01     Sodium 10/07/2017 142     Potassium 10/07/2017 4 3     Chloride 10/07/2017 112*    CO2 10/07/2017 19*    Anion Gap 10/07/2017 11     BUN 10/07/2017 20     Creatinine 10/07/2017 1 26     Glucose 10/07/2017 159*    Calcium 10/07/2017 7 5*    eGFR 10/07/2017 36     Sodium 10/08/2017 148*    Potassium 10/08/2017 4 9     Chloride 10/08/2017 118*    CO2 10/08/2017 23     Anion Gap 10/08/2017 7     BUN 10/08/2017 14     Creatinine 10/08/2017 1 16     Glucose 10/08/2017 138     Calcium 10/08/2017 7 8*    eGFR 10/08/2017 40     WBC 10/09/2017 9 13     RBC 10/09/2017 3 51*    Hemoglobin 10/09/2017 10 5*    Hematocrit 10/09/2017 34 1*    MCV 10/09/2017 97     MCH 10/09/2017 29 9     MCHC 10/09/2017 30 8*    RDW 10/09/2017 15 7*    MPV 10/09/2017 10 8     Platelets 21/69/3773 182     nRBC 10/09/2017 0     Sodium 10/09/2017 148*    Potassium 10/09/2017 4 8     Chloride 10/09/2017 120*    CO2 10/09/2017 21     Anion Gap 10/09/2017 7     BUN 10/09/2017 13     Creatinine 10/09/2017 1 10     Glucose 10/09/2017 148*    Calcium 10/09/2017 7 9*    AST 10/09/2017 10     ALT 10/09/2017 <6*    Alkaline Phosphatase 10/09/2017 44*    Total Protein 10/09/2017 4 9*    Albumin 10/09/2017 1 8*    Total Bilirubin 10/09/2017 0 21     eGFR 10/09/2017 42     Magnesium 10/09/2017 1 3*    Phosphorus 10/09/2017 1 6*    Ventricular Rate 10/09/2017 76     Atrial Rate 10/09/2017 76     TX Interval 10/09/2017 144     QRSD Interval 10/09/2017 74     QT Interval 10/09/2017 388     QTC Interval 10/09/2017 436     P Axis 10/09/2017 37     QRS Axis 10/09/2017 -48     T Wave Axis 10/09/2017 98     Segmented % 10/09/2017 71     Bands % 10/09/2017 3     Lymphocytes % 10/09/2017 16     Monocytes % 10/09/2017 4     Eosinophils % 10/09/2017 1     Basophils % 10/09/2017 0     Myelocytes % 10/09/2017 5*    Absolute Neutrophils 10/09/2017 6 76     Lymphocytes Absolute 10/09/2017 1 46     Monocytes Absolute 10/09/2017 0 37     Eosinophils Absolute 10/09/2017 0 09     Basophils Absolute 10/09/2017 0 00     Total Counted 10/09/2017 100     [place] Cells 10/09/2017 Present     Platelet Estimate 35/65/6017 Adequate        Results from last 7 days  Lab Units 10/09/17  0620 10/08/17  0629 10/07/17  0537 10/05/17  0450 10/04/17  1641   SODIUM mmol/L 148* 148* 142 140 142   POTASSIUM mmol/L 4 8 4 9 4 3 3 4* 4 3   CHLORIDE mmol/L 120* 118* 112* 105 103   CO2 mmol/L 21 23 19* 27 29   ANION GAP mmol/L 7 7 11 8 10   BUN mg/dL 13 14 20 29* 28*   CREATININE mg/dL 1 10 1 16 1 26 1 46* 1 59*   EGFR ml/min/1 73sq m 42 40 36 30 27   GLUCOSE RANDOM mg/dL 148* 138 159* 153* 153*   CALCIUM mg/dL 7 9* 7 8* 7 5* 8 2* 9 4   AST U/L 10  --   --  12 19   ALT U/L <6*  --   --  9* 13   ALK PHOS U/L 44*  --   --  47 68   TOTAL PROTEIN g/dL 4 9*  --   --  6 0* 7 7   ALBUMIN g/dL 1 8*  --   --  2 4* 3 1*   BILIRUBIN TOTAL mg/dL 0 21  --   --  0 35 0 50       Results from last 7 days  Lab Units 10/09/17  0620 10/07/17  0537 10/05/17  0450   WBC Thousand/uL 9 13 7 37 8 05   HEMOGLOBIN g/dL 10 5* 11 2* 11 0*   PLATELETS Thousands/uL 182 201 213           Imaging:  I have personally reviewed all pertinent results  EKG, Pathology, and Other Studies:   I have personally reviewed all pertinent results  Assessment/Plan   Patient Active Problem List   Diagnosis    Other partial intestinal obstruction    Essential hypertension    Platelet inhibition due to Plavix    Renal insufficiency    Hiatal hernia       1  Small bowel obstruction  - treating conservatively; pt refused NGT; clinical not getting worse  - Abd XR (10/8): Stable small bowel dilatation  Partial obstruction versus ileus  Stable hiatal hernia  - WBC 7 37> 9 13, Rodger 71% (10/9);  Na: 148, K: 4 8, Cl: 120; BUN: 13, Cr 1 10  - change to clear liquid and closely f/u on clinical symptoms; currently do not need to repeat obstruction series    2  Medical management: per IM team   We appreciate their support and contributions  PPX  - DVT: Heparin and SCD      The attending physician, Dr Ashtyn Prescott, agreed with the plan      Emilia Dodson MD, PGY-1  10/9/2017

## 2017-10-09 NOTE — CASE MANAGEMENT
Continued Stay Review    Date:    10/9/2017    Vital Signs: BP (!) 185/81   Pulse 79   Temp (!) 97 °F (36 1 °C) (Tympanic)   Resp 17   Ht 5' (1 524 m)   Wt 49 7 kg (109 lb 9 1 oz)   SpO2 97%   BMI 21 40 kg/m²     Medications:   Scheduled Meds:   cefepime 1,000 mg Intravenous Q24H   heparin (porcine) 5,000 Units Subcutaneous Q8H [address]   hydrALAZINE 10 mg Intravenous Once   metoprolol 2 5 mg Intravenous Q8H   metroNIDAZOLE 500 mg Intravenous Q8H   pantoprazole 40 mg Intravenous Daily   potassium phosphate 21 mmol Intravenous Once     Continuous Infusions:   dextrose 50 mL/hr Last Rate: 50 mL/hr (10/09/17 1227)     PRN Meds:   carbamide peroxide    hydrALAZINE    morphine injection    ondansetron    phenol    promethazine    Abnormal Labs/Diagnostic Results:   H/H   10 5/34 1  NA  148  Chloride  120    Age/Sex: 80 y o  female     Assessment/Plan:    Small bowel obstruction- treating conservatively  Pt refused ngt  Pt continues to improvement  Spoke with surgery who will advance diet to clears       2  Mild vascular congestion- If pt is tolerating clears will stop IVF and try lasix in 24 hours     Active problems  1  Possible aspiration pneumonia-continue cefepime and Flagyl day 5/7     2  Hypertension- continue IV metoprolol      3  Chronic kidney disease 3- at baseline     4  GERD- continue ppi     5  Hypernatremia- change fluids to d5w  Check na in am     6   Hypophosphatemia- replace     7  hypomagnesia replace    Discharge Plan:    Return to [place]

## 2017-10-09 NOTE — PROGRESS NOTES
[facility] Internal Medicine Progress Note  Patient: Johnny Rios 80 y o  female   MRN: [id number]  PCP: Veronique Carlin, DO  Unit/Bed#: [address] 223-01 Encounter: [id number]  Date Of Visit: 10/09/17      Assessment/plan  Principal problem  1  Small bowel obstruction- treating conservatively  Pt refused ngt  Pt continues to improvement  Spoke with surgery who will advance diet to clears       2  Mild vascular congestion- If pt is tolerating clears will stop IVF and try lasix in 24 hours     Active problems  1  Possible aspiration pneumonia-continue cefepime and Flagyl day 5/7     2  Hypertension- continue IV metoprolol      3  Chronic kidney disease 3- at baseline     4  GERD- continue ppi    5  Hypernatremia- change fluids to d5w  Check na in am    6  Hypophosphatemia- replace    7  hypomagnesia replace    dispo- niece is at bedside    Subjective:   Pt seen and examined  Pt is tolerating ice chips  No increase pain  No nausea/vomiting  No f/c no cp no sob no diarrhea    Objective:     Vitals: Blood pressure (!) 171/77, pulse 85, temperature (!) 96 7 °F (35 9 °C), temperature source Tympanic, resp  rate 18, height 5' (1 524 m), weight 49 7 kg (109 lb 9 1 oz), SpO2 92 %  ,Body mass index is 21 4 kg/m²  Lab, Imaging and other studies:    Results from last 7 days  Lab Units 10/09/17  0620   WBC Thousand/uL 9 13   HEMOGLOBIN g/dL 10 5*   HEMATOCRIT % 34 1*   PLATELETS Thousands/uL 182       Results from last 7 days  Lab Units 10/09/17  0620   SODIUM mmol/L 148*   POTASSIUM mmol/L 4 8   CHLORIDE mmol/L 120*   CO2 mmol/L 21   BUN mg/dL 13   CREATININE mg/dL 1 10   CALCIUM mg/dL 7 9*   TOTAL PROTEIN g/dL 4 9*   BILIRUBIN TOTAL mg/dL 0 21   ALK PHOS U/L 44*   ALT U/L <6*   AST U/L 10   GLUCOSE RANDOM mg/dL 148*         Lab Results   Component Value Date    URINECX Culture results to follow   09/07/2017    URINECX >100,000 cfu/ml Pseudomonas aeruginosa 09/07/2017    URINECX 50,000-59,000 cfu/ml Escherichia coli 09/07/2017 Physical exam:  Physical Exam  General appearance: alert, appears stated age and cooperative  Head: Normocephalic, without obvious abnormality, atraumatic  Eyes: conjunctivae/corneas clear  PERRL, EOM's intact  Fundi benign    Neck: no adenopathy, no carotid bruit, no JVD, supple, symmetrical, trachea midline and thyroid not enlarged, symmetric, no tenderness/mass/nodules  Lungs: slight decrease on right lower lobe  Heart: regular rate and rhythm, S1, S2 normal, no murmur, click, rub or gallop  Abdomen: soft slight distention not ttp +bs  Extremities: edema +1 bilateral lower ext  Pulses: 2+ and symmetric  Skin: Skin color, texture, turgor normal  No rashes or lesions  Neurologic: awake alert oriented x3 with some confusion

## 2017-10-09 NOTE — PHYSICAL THERAPY NOTE
PT EVALUATION  Patient Active Problem List   Diagnosis    Other partial intestinal obstruction    Essential hypertension    Platelet inhibition due to Plavix    Renal insufficiency    Hiatal hernia     Past Medical History:   Diagnosis Date    breast cancer     GERD (gastroesophageal reflux disease)     Glaucoma     Gout     Hyperlipidemia     Hypertension     Osteoarthritis     Stroke [facility])      Past Surgical History:   Procedure Laterality Date    ANKLE SURGERY      BREAST LUMPECTOMY Left     [id number]        10/09/17 1135   Note Type   Note type Eval only   Pain Assessment   Pain Score No Pain   Home Living   Type of Home Assisted living  ([facility])   Home Equipment Other (Comment)  (rollator)   Prior Function   Level of [place] Needs assistance with ADLs and functional mobility   Lives With Facility staff   Receives Help From Personal care attendant   ADL Assistance Needs assistance   Falls in the last 6 months (pt denies)   Comments per CM notes, pt resident of [facility], pt able to ambulate w/ rollator to dining room; requires (A) w/ ADL's   Restrictions/Precautions   Weight Bearing Precautions Per Order No   Other Precautions Multiple lines; Fall Risk   General   Additional Pertinent History baseline dementia   Family/Caregiver Present No   Cognition   Arousal/Participation Alert   Orientation Level Oriented to person   Following Commands Follows one step commands with increased time or repetition   RUE Assessment   RUE Assessment WFL   RUE Strength   RUE Overall Strength Other (Comment)  (3+/5 grossly)   LUE Assessment   LUE Assessment WFL   LUE Strength   LUE Overall Strength Other (Comment)  (3+/5 grossly)   RLE Assessment   RLE Assessment WFL   Strength RLE   RLE Overall Strength 3+/5   LLE Assessment   LLE Assessment WFL   Strength LLE   LLE Overall Strength 3+/5   Bed Mobility   Additional Comments unable to assess, pt OOB in chair pre & post session   Transfers Sit to Stand Unable to assess  (pt strongly refused)   Ambulation/Elevation   Gait pattern (unable to assess, pt refused)   Balance   Static Sitting Fair   Activity Tolerance   Activity Tolerance Patient limited by fatigue   Nurse Made Aware Karin   Assessment   Problem List Decreased strength;Decreased endurance;Decreased mobility; Impaired judgement; Impaired vision   Assessment Pt  80 y  o female admitted for partial SBO & treated conservatively  Pt referred to PT for mobility assessment & D/C planning  PTA, pt resident [facility]; per CM notes, pt able to ambulate w/ rollator to dining room; require (A) w/ ADL's  PT eval limited to pt's participation  Pt OOB in chair upon my arrival  Pt strongly refused to perform bed mobility, transfers & amb  Pt states, "I'm not ready for that " Encourage multiple times but pt continue to refused  Will continue skilled PT to improve function & safety  PT to see next tx session to complete mobility assessment & determine D/C rec  Will defer D/C rec at this time, pending PT eval completion  CM to follow  Pt remain OOB in chair at end of session w/o issues  Call bell in reach  Nsg staff to continue to mobilized pt as tolerated to prevent further decline in function  Nsg notified  Barriers to Discharge None   Goals   Patient Goals none stated   [facility] Expiration Date 10/16/17   Short Term Goal #1 Goals to be met in 7 days: 1) inc strength by 1/2 grade; 2) PT to complete mobility assessment, set appropriate goals & determine D/C rec; 3) pt/caregiver ed   Plan   Treatment/Interventions Therapeutic exercise;LE strengthening/ROM; Continued evaluation;Spoke to nursing;Family   PT Frequency 5x/wk   Recommendation   Recommendation Defer at this time   Barthel Index   Feeding 5   Bathing 0   Grooming Score 0   Dressing Score 0   Bladder Score 5   Bowels Score 5   Toilet Use Score 0   Transfers (Bed/Chair) Score 0   Mobility (Level Surface) Score 0   Stairs Score 0   Barthel Index Score 15   Hx/personal factors: co-morbidities; fall risk; currently functioning below baseline; lines; dementia  Examination: dec mobility, dec balance, dec endurance, dec amb, high risk for falls  Clinical: unpredictable (ongoing medical status; abnormal lab values; fall risk)  Complexity: high    Shane Armando, PT

## 2017-10-09 NOTE — PLAN OF CARE
Problem: PHYSICAL THERAPY ADULT  Goal: Performs mobility at highest level of function for planned discharge setting  See evaluation for individualized goals  Treatment/Interventions: Therapeutic exercise, LE strengthening/ROM, Continued evaluation, Spoke to nursing, Family          See flowsheet documentation for full assessment, interventions and recommendations  Outcome: Progressing     Problem List: Decreased strength, Decreased endurance, Decreased mobility, Impaired judgement, Impaired vision  Assessment: Pt  89 y  o female admitted for partial SBO & treated conservatively  Pt referred to PT for mobility assessment & D/C planning  [facility]; per CM notes, pt able to ambulate w/ rollator to dining room; require (A) w/ ADL's  PT eval limited to pt's participation  Pt OOB in chair upon my arrival  Pt strongly refused to perform bed mobility, transfers & amb  Pt states, "I'm not ready for that " Encourage multiple times but pt continue to refused  Will continue skilled PT to improve function & safety  PT to see next tx session to complete mobility assessment & determine D/C rec  Will defer D/C rec at this time, pending PT eval completion  CM to follow  Pt remain OOB in chair at end of session w/o issues  Call bell in reach  Nsg staff to continue to mobilized pt as tolerated to prevent further decline in function  Nsg notified  Barriers to Discharge: None     Recommendation: Defer at this time          See flowsheet documentation for full assessment

## 2017-10-09 NOTE — PROGRESS NOTES
Agree with previous nurses assessment  Pt readjusted for comfort  No further complaints  Call bell within reach   Will continue to monitor

## 2017-10-10 LAB
ANION GAP SERPL CALCULATED.3IONS-SCNC: 8 MMOL/L (ref 4–13)
BUN SERPL-MCNC: 12 MG/DL (ref 5–25)
CALCIUM SERPL-MCNC: 8.1 MG/DL (ref 8.3–10.1)
CHLORIDE SERPL-SCNC: 115 MMOL/L (ref 100–108)
CO2 SERPL-SCNC: 20 MMOL/L (ref 21–32)
CREAT SERPL-MCNC: 1.18 MG/DL (ref 0.6–1.3)
ERYTHROCYTE [DISTWIDTH] IN BLOOD BY AUTOMATED COUNT: 15.7 % (ref 11.6–15.1)
GFR SERPL CREATININE-BSD FRML MDRD: 39 ML/MIN/1.73SQ M
GLUCOSE SERPL-MCNC: 112 MG/DL (ref 65–140)
HCT VFR BLD AUTO: 35.8 % (ref 34.8–46.1)
HGB BLD-MCNC: 11.5 G/DL (ref 11.5–15.4)
MCH RBC QN AUTO: 30.5 PG (ref 26.8–34.3)
MCHC RBC AUTO-ENTMCNC: 32.1 G/DL (ref 31.4–37.4)
MCV RBC AUTO: 95 FL (ref 82–98)
PLATELET # BLD AUTO: 241 THOUSANDS/UL (ref 149–390)
PMV BLD AUTO: 10.2 FL (ref 8.9–12.7)
POTASSIUM SERPL-SCNC: 4.2 MMOL/L (ref 3.5–5.3)
RBC # BLD AUTO: 3.77 MILLION/UL (ref 3.81–5.12)
SODIUM SERPL-SCNC: 143 MMOL/L (ref 136–145)
WBC # BLD AUTO: 10.87 THOUSAND/UL (ref 4.31–10.16)

## 2017-10-10 PROCEDURE — 97530 THERAPEUTIC ACTIVITIES: CPT

## 2017-10-10 PROCEDURE — 80048 BASIC METABOLIC PNL TOTAL CA: CPT | Performed by: INTERNAL MEDICINE

## 2017-10-10 PROCEDURE — 97110 THERAPEUTIC EXERCISES: CPT

## 2017-10-10 PROCEDURE — 85027 COMPLETE CBC AUTOMATED: CPT | Performed by: INTERNAL MEDICINE

## 2017-10-10 PROCEDURE — C9113 INJ PANTOPRAZOLE SODIUM, VIA: HCPCS | Performed by: SURGERY

## 2017-10-10 RX ORDER — METOPROLOL TARTRATE 50 MG/1
50 TABLET, FILM COATED ORAL EVERY 12 HOURS SCHEDULED
Status: DISCONTINUED | OUTPATIENT
Start: 2017-10-10 | End: 2017-10-14 | Stop reason: HOSPADM

## 2017-10-10 RX ORDER — TIMOLOL MALEATE 5 MG/ML
1 SOLUTION/ DROPS OPHTHALMIC DAILY
Status: DISCONTINUED | OUTPATIENT
Start: 2017-10-10 | End: 2017-10-14 | Stop reason: HOSPADM

## 2017-10-10 RX ORDER — METOPROLOL TARTRATE 5 MG/5ML
2.5 INJECTION INTRAVENOUS EVERY 6 HOURS PRN
Status: DISCONTINUED | OUTPATIENT
Start: 2017-10-10 | End: 2017-10-13

## 2017-10-10 RX ADMIN — METRONIDAZOLE 500 MG: 500 INJECTION, SOLUTION INTRAVENOUS at 03:36

## 2017-10-10 RX ADMIN — PANTOPRAZOLE SODIUM 40 MG: 40 INJECTION, POWDER, FOR SOLUTION INTRAVENOUS at 09:06

## 2017-10-10 RX ADMIN — METRONIDAZOLE 500 MG: 500 INJECTION, SOLUTION INTRAVENOUS at 20:09

## 2017-10-10 RX ADMIN — HEPARIN SODIUM 5000 UNITS: 5000 INJECTION, SOLUTION INTRAVENOUS; SUBCUTANEOUS at 05:40

## 2017-10-10 RX ADMIN — HEPARIN SODIUM 5000 UNITS: 5000 INJECTION, SOLUTION INTRAVENOUS; SUBCUTANEOUS at 21:10

## 2017-10-10 RX ADMIN — METOPROLOL TARTRATE 2.5 MG: 5 INJECTION INTRAVENOUS at 09:06

## 2017-10-10 RX ADMIN — METRONIDAZOLE 500 MG: 500 INJECTION, SOLUTION INTRAVENOUS at 12:42

## 2017-10-10 RX ADMIN — TIMOLOL MALEATE 1 DROP: 5 SOLUTION/ DROPS OPHTHALMIC at 14:21

## 2017-10-10 RX ADMIN — METOPROLOL TARTRATE 2.5 MG: 5 INJECTION, SOLUTION INTRAVENOUS at 21:10

## 2017-10-10 RX ADMIN — METOPROLOL TARTRATE 2.5 MG: 5 INJECTION INTRAVENOUS at 00:03

## 2017-10-10 RX ADMIN — HEPARIN SODIUM 5000 UNITS: 5000 INJECTION, SOLUTION INTRAVENOUS; SUBCUTANEOUS at 14:20

## 2017-10-10 RX ADMIN — CEFEPIME HYDROCHLORIDE 1000 MG: 1 INJECTION, POWDER, FOR SOLUTION INTRAMUSCULAR; INTRAVENOUS at 02:38

## 2017-10-10 NOTE — PROGRESS NOTES
[facility] Progress Note  Patient: Akila Manzano 80 y o  female   MRN: [id number]  PCP: Niecy Weiss, DO  Unit/Bed#: [facility] 2 -01 Encounter: [id number]  Date Of Visit: 10/10/17      Assessment/plan  Principal problem  1  Small bowel obstruction- treating conservatively  Pt refused ngt  Pt continues to improvement  Pt tolerating clear liquids  Possibly advance to full liquid diet  Await surgery follow up       2  Mild vascular congestion- d/c IVF  If pt still tolerating clears possibly restart lasix in 24 hours       Active problems  1  Possible aspiration pneumonia-continue cefepime and Flagyl day 6/7     2  Hypertension- restart metoprolol  Change IV metoprolol to prn  Will need to restart norvasc and diovan if still tolerating po     3  Chronic kidney disease 3- at baseline     4  GERD- continue ppi     5  Hypernatremia- resolved     6  Hypophosphatemia- replace     7  hypomagnesia replace    8  Bilateral paresthesias of fingers- possibly due to positional paresthesias vrs vitamin def- will check vitamin b12  Check ionized calicum  Check phosphorus  Check magnesium       dispo- niece is at bedside    Subjective:   Pt seen and examined  Pt c/o bilateral numbness in her fingers  She stated it just started  She moved her hands but no improvement  No history of this  No f/c no cp no sob no n/v/d no worsening abd pain  Objective:     Vitals: Blood pressure 152/87, pulse 77, temperature 98 1 °F (36 7 °C), temperature source Tympanic, resp  rate 16, height 5' (1 524 m), weight 49 7 kg (109 lb 9 1 oz), SpO2 97 %  ,Body mass index is 21 4 kg/m²      Lab, Imaging and other studies:    Results from last 7 days  Lab Units 10/10/17  0715   WBC Thousand/uL 10 87*   HEMOGLOBIN g/dL 11 5   HEMATOCRIT % 35 8   PLATELETS Thousands/uL 241       Results from last 7 days  Lab Units 10/10/17  0715 10/09/17  0620   SODIUM mmol/L 143 148*   POTASSIUM mmol/L 4 2 4 8   CHLORIDE mmol/L 115* 120*   CO2 mmol/L 20* 21   BUN mg/dL 12 13   CREATININE mg/dL 1 18 1 10   CALCIUM mg/dL 8 1* 7 9*   TOTAL PROTEIN g/dL  --  4 9*   BILIRUBIN TOTAL mg/dL  --  0 21   ALK PHOS U/L  --  44*   ALT U/L  --  <6*   AST U/L  --  10   GLUCOSE RANDOM mg/dL 112 148*         Lab Results   Component Value Date    URINECX Culture results to follow  09/07/2017    URINECX >100,000 cfu/ml Pseudomonas aeruginosa 09/07/2017    URINECX 50,000-59,000 cfu/ml Escherichia coli 09/07/2017     Physical exam:  Physical Exam  General appearance: alert, appears stated age and cooperative  Head: Normocephalic, without obvious abnormality, atraumatic  Eyes: conjunctivae/corneas clear  PERRL, EOM's intact  Fundi benign  Neck: no adenopathy, no carotid bruit, no JVD, supple, symmetrical, trachea midline and thyroid not enlarged, symmetric, no tenderness/mass/nodules  Lungs: decrease breathsounds at bases bilateral  Heart: regular rate and rhythm, S1, S2 normal, no murmur, click, rub or gallop  Abdomen: soft minimally distended NT decrease bs  Extremities: extremities normal, atraumatic, no cyanosis or edema  Pulses: 2+ and symmetric  Skin: Skin color, texture, turgor normal  No rashes or lesions  Neurologic: awake alert oriented x3 minimal confusion  Negative tinel and Phalen test  Paresthesia over 2nd -5th digit bilateral  4/5 muscle strength with slightly weaker on right hand          VTE Pharmacologic Prophylaxis: Heparin  VTE Mechanical Prophylaxis: sequential compression device    Counseling / Coordination of Care  Total floor / unit time spent today 20 minutes   Current Length of Stay: 5 day(s)    Current Patient Status: Inpatient       Code Status: Level 3 - DNAR and DNI

## 2017-10-10 NOTE — PLAN OF CARE
DISCHARGE PLANNING     Discharge to home or other facility with appropriate resources Progressing        GASTROINTESTINAL - ADULT     Minimal or absence of nausea and/or vomiting Progressing     Maintains or returns to baseline bowel function Progressing     Maintains adequate nutritional intake Progressing        INFECTION - ADULT     Absence or prevention of progression during hospitalization Progressing     Absence of fever/infection during neutropenic period Progressing        Knowledge Deficit     Patient/family/caregiver demonstrates understanding of disease process, treatment plan, medications, and discharge instructions Progressing        Nutrition/Hydration-ADULT     Nutrient/Hydration intake appropriate for improving, restoring or maintaining nutritional needs Progressing        PAIN - ADULT     Verbalizes/displays adequate comfort level or baseline comfort level Progressing        Potential for Falls     Patient will remain free of falls Progressing        Prexisting or High Potential for Compromised Skin Integrity     Skin integrity is maintained or improved Progressing        SAFETY ADULT     Maintain or return to baseline ADL function Progressing     Maintain or return mobility status to optimal level Progressing

## 2017-10-10 NOTE — PROGRESS NOTES
Progress Note - General Surgery   Bolivar Traore 80 y o  female MRN: [id number]  Unit/Bed#: [place] [address] -01 Encounter: [id number]      Subjective/Objective     Subjective: The patient feeling better  Requesting more food  Positive bowel movement per the nurses  Objective:     /87   Pulse 77   Temp 98 1 °F (36 7 °C) (Tympanic)   Resp 16   Ht 5' (1 524 m)   Wt 49 7 kg (109 lb 9 1 oz)   SpO2 97%   BMI 21 40 kg/m²       Intake/Output Summary (Last 24 hours) at 10/10/17 1442  Last data filed at 10/10/17 0906   Gross per 24 hour   Intake              980 ml   Output                0 ml   Net              980 ml       Invasive Devices     Peripheral Intravenous Line            Peripheral IV 10/10/17 Right Arm less than 1 day          Drain            NG/OG/Enteral Tube Nasogastric 14 Fr Left mouth 5 days                Physical Exam:       General Appearance:    Alert, cooperative, no distress, appears stated age   Head:    Normocephalic, without obvious abnormality, atraumatic   Nose:   Nares normal   Throat:   Lips, mucosa normal, pharynx clear   Neck:   Supple, symmetrical   Chest/Breast:   Def   Lungs:     Clear to auscultation bilaterally, respirations unlabored   Heart:    Regular rate and rhythm, S1 and S2 normal, no murmur, rub    or gallop   Abdomen:     Soft, non-tender, non distended       Genitalia:    Def   Rectal:    Def   Extremities:   Extremities normal, atraumatic, no cyanosis or edema   Skin:   Skin color, texture, turgor normal, no rashes or lesions   Neurologic:                    Lab, Imaging and other studies:I have personally reviewed pertinent lab results  Labs in chart were reviewed      VTE Pharmacologic Prophylaxis: Heparin  VTE Mechanical Prophylaxis: sequential compression device    Assessment/Plan:  Partial small-bowel obstruction versus ileus - resolving  Advance diet to surgical soft    PT evaluation appreciated - patient may need short-term rehab due to deconditioning    Possible aspiration pneumonia continue antibiotics    DC planning    Patient Active Problem List   Diagnosis    Other partial intestinal obstruction    Essential hypertension    Platelet inhibition due to Plavix    Renal insufficiency    Hiatal hernia            This text is generated with voice recognition software  There may be translation, syntax,  or grammatical errors  If you have any questions, please contact the dictating provider

## 2017-10-10 NOTE — PLAN OF CARE
Problem: PHYSICAL THERAPY ADULT  Goal: Performs mobility at highest level of function for planned discharge setting  See evaluation for individualized goals  Treatment/Interventions: Therapeutic exercise, LE strengthening/ROM, Continued evaluation, Spoke to nursing, Family          See flowsheet documentation for full assessment, interventions and recommendations  Outcome: Progressing  Prognosis: Fair  Problem List: Decreased strength, Decreased endurance, Impaired balance, Decreased mobility, Impaired vision  Assessment: Mobility assessment complete  See above levels of assistance required for all functional tasks  Pt demonstrate poor standing balance & tolerance  Pt retropulsive w/ forward flexed posture & poor UE support during standing  Gait training not appropriate at this time  Pt high risk for falls  Pt tolerated above mentioned thera  ex well, AAROM  Will continue PT per POC  Pt may return to previous F w/ HHPT if facility able to take pt back at her current level of function  If not, pt will need inpt rehab at D/C   to follow  Pt tolerated OOB in chair at end of session w/o issues  Call bell in reach  Nsg staff to continue to mobilized pt as tolerated to prevent decline in function  Ns notified  Barriers to Discharge: None     Recommendation: Short-term skilled PT, Home PT (STR vs HHPT at [facility] depending on PCF)     PT - OK to Discharge: Yes (when medically cleared)    See flowsheet documentation for full assessment

## 2017-10-10 NOTE — PHYSICAL THERAPY NOTE
PT PROGRESS NOTE     10/10/17 5963   Pain Assessment   Pain Score No Pain   Restrictions/Precautions   Weight Bearing Precautions Per Order No   Other Precautions Multiple lines; Fall Risk   General   Chart Reviewed Yes   Response to Previous Treatment Patient with no complaints from previous session  Family/Caregiver Present Yes  (niece)   Cognition   Arousal/Participation Alert; Cooperative   Attention Within functional limits   Following Commands Follows one step commands without difficulty   Subjective   Subjective Pt agreeable to therapy  Bed Mobility   Rolling R 3  Moderate assistance   Additional items Assist x 1;Bedrails; Increased time required;Verbal cues;LE management   Rolling L 3  Moderate assistance   Additional items Assist x 1;Bedrails; Increased time required;Verbal cues;LE management   Supine to Sit 3  Moderate assistance   Additional items Assist x 1;HOB elevated; Bedrails; Increased time required;Verbal cues;LE management   Sit to Supine 3  Moderate assistance   Additional items Assist x 1; Increased time required;Verbal cues;LE management   Transfers   Sit to Stand 2  Maximal assistance   Additional items Assist x 2;Bedrails; Increased time required;Verbal cues   Stand to Sit 2  Maximal assistance   Additional items Assist x 2; Increased time required;Verbal cues; Bedrails   Stand pivot 2  Maximal assistance   Additional items Assist x 2; Increased time required;Verbal cues   Ambulation/Elevation   Gait pattern Not appropriate  (at this time)   Balance   Static Sitting Fair   Static Standing Poor -   Ambulatory Zero   Endurance Deficit   Endurance Deficit Yes   Endurance Deficit Description fatigue   Activity Tolerance   Activity Tolerance Patient limited by fatigue   Nurse Made Aware Catherine   Exercises   Heelslides Supine;10 reps;AAROM; Bilateral   Hip Flexion Supine;10 reps;AAROM; Bilateral  (SLR)   Hip Abduction Supine;10 reps;AAROM; Bilateral   Hip Adduction Supine;10 reps;AAROM; Bilateral   Knee AROM Short Arc Quad Supine;10 reps;AAROM; Bilateral   Ankle Pumps Supine;10 reps;AAROM; Bilateral   Assessment   Prognosis Fair   Problem List Decreased strength;Decreased endurance; Impaired balance;Decreased mobility; Impaired vision   Assessment Mobility assessment complete  See above levels of assistance required for all functional tasks  Pt demonstrate poor standing balance & tolerance  Pt retropulsive w/ forward flexed posture & poor UE support during standing  Gait training not appropriate at this time  Pt high risk for falls  Pt tolerated above mentioned thera  ex well, AAROM  Will continue PT per POC  Pt may return to previous F w/ HHPT if facility able to take pt back at her current level of function  If not, pt will need inpt rehab at D/C   to follow  Pt tolerated OOB in chair at end of session w/o issues  Call bell in reach  Nsg staff to continue to mobilized pt as tolerated to prevent decline in function  Nsg notified  Barriers to Discharge None   Goals   Patient Goals get better   STG Expiration Date 10/16/17   Short Term Goal #1 Additional goals: 1) inc bed mobility to minAx1; 2) inc transfers to modAx1; 3) inc amb w/ RW approx  48' w/ modAx1   Treatment Day 1   Plan   Treatment/Interventions Functional transfer training;LE strengthening/ROM; Therapeutic exercise; Endurance training;Patient/family training;Bed mobility;Gait training;Spoke to nursing;Family   Progress Progressing toward goals   PT Frequency 5x/wk   Recommendation   Recommendation Short-term skilled PT; Home PT  (STR vs HHPT at F depending on PCF)   Equipment Recommended Walker  (RW)   PT - OK to Discharge Yes  (when medically cleared)   Kapil Zepeda, PT

## 2017-10-11 LAB
ANION GAP SERPL CALCULATED.3IONS-SCNC: 7 MMOL/L (ref 4–13)
BUN SERPL-MCNC: 13 MG/DL (ref 5–25)
CALCIUM SERPL-MCNC: 8.3 MG/DL (ref 8.3–10.1)
CHLORIDE SERPL-SCNC: 111 MMOL/L (ref 100–108)
CO2 SERPL-SCNC: 20 MMOL/L (ref 21–32)
CREAT SERPL-MCNC: 1.09 MG/DL (ref 0.6–1.3)
GFR SERPL CREATININE-BSD FRML MDRD: 43 ML/MIN/1.73SQ M
GLUCOSE SERPL-MCNC: 119 MG/DL (ref 65–140)
MAGNESIUM SERPL-MCNC: 1.7 MG/DL (ref 1.6–2.6)
PHOSPHATE SERPL-MCNC: 3.2 MG/DL (ref 2.3–4.1)
POTASSIUM SERPL-SCNC: 5.1 MMOL/L (ref 3.5–5.3)
SODIUM SERPL-SCNC: 138 MMOL/L (ref 136–145)
VIT B12 SERPL-MCNC: 1167 PG/ML (ref 100–900)

## 2017-10-11 PROCEDURE — 80048 BASIC METABOLIC PNL TOTAL CA: CPT | Performed by: INTERNAL MEDICINE

## 2017-10-11 PROCEDURE — 83735 ASSAY OF MAGNESIUM: CPT | Performed by: INTERNAL MEDICINE

## 2017-10-11 PROCEDURE — 84100 ASSAY OF PHOSPHORUS: CPT | Performed by: INTERNAL MEDICINE

## 2017-10-11 PROCEDURE — 82607 VITAMIN B-12: CPT | Performed by: INTERNAL MEDICINE

## 2017-10-11 RX ORDER — METRONIDAZOLE 500 MG/1
500 TABLET ORAL EVERY 8 HOURS SCHEDULED
Status: COMPLETED | OUTPATIENT
Start: 2017-10-11 | End: 2017-10-11

## 2017-10-11 RX ORDER — PANTOPRAZOLE SODIUM 40 MG/1
40 TABLET, DELAYED RELEASE ORAL
Status: DISCONTINUED | OUTPATIENT
Start: 2017-10-12 | End: 2017-10-14 | Stop reason: HOSPADM

## 2017-10-11 RX ORDER — AMLODIPINE BESYLATE 5 MG/1
5 TABLET ORAL DAILY
Status: DISCONTINUED | OUTPATIENT
Start: 2017-10-12 | End: 2017-10-13

## 2017-10-11 RX ORDER — FUROSEMIDE 20 MG/1
20 TABLET ORAL DAILY
Status: DISCONTINUED | OUTPATIENT
Start: 2017-10-11 | End: 2017-10-14 | Stop reason: HOSPADM

## 2017-10-11 RX ORDER — VALSARTAN 160 MG/1
320 TABLET ORAL DAILY
Status: DISCONTINUED | OUTPATIENT
Start: 2017-10-11 | End: 2017-10-14 | Stop reason: HOSPADM

## 2017-10-11 RX ADMIN — METOPROLOL TARTRATE 50 MG: 50 TABLET ORAL at 21:21

## 2017-10-11 RX ADMIN — METOPROLOL TARTRATE 50 MG: 50 TABLET ORAL at 09:57

## 2017-10-11 RX ADMIN — CEFEPIME HYDROCHLORIDE 1000 MG: 1 INJECTION, POWDER, FOR SOLUTION INTRAMUSCULAR; INTRAVENOUS at 03:10

## 2017-10-11 RX ADMIN — HEPARIN SODIUM 5000 UNITS: 5000 INJECTION, SOLUTION INTRAVENOUS; SUBCUTANEOUS at 05:11

## 2017-10-11 RX ADMIN — HEPARIN SODIUM 5000 UNITS: 5000 INJECTION, SOLUTION INTRAVENOUS; SUBCUTANEOUS at 14:37

## 2017-10-11 RX ADMIN — METRONIDAZOLE 500 MG: 500 TABLET ORAL at 14:37

## 2017-10-11 RX ADMIN — METRONIDAZOLE 500 MG: 500 INJECTION, SOLUTION INTRAVENOUS at 04:11

## 2017-10-11 RX ADMIN — HYDRALAZINE HYDROCHLORIDE 5 MG: 20 INJECTION INTRAMUSCULAR; INTRAVENOUS at 05:11

## 2017-10-11 RX ADMIN — FUROSEMIDE 20 MG: 20 TABLET ORAL at 14:37

## 2017-10-11 RX ADMIN — VALSARTAN 320 MG: 160 TABLET ORAL at 14:37

## 2017-10-11 RX ADMIN — METRONIDAZOLE 500 MG: 500 TABLET ORAL at 21:22

## 2017-10-11 RX ADMIN — TIMOLOL MALEATE 1 DROP: 5 SOLUTION/ DROPS OPHTHALMIC at 09:57

## 2017-10-11 NOTE — PROGRESS NOTES
[facility] Internal Medicine Progress Note  Patient: Debby Toledo 80 y o  female   MRN: [id number]  PCP: Donna Owens DO  Unit/Bed#: [facility] [address] 2 -01 Encounter: [id number]  Date Of Visit: 10/11/17    Assessment:    Principal Problem:    Other partial intestinal obstruction  Active Problems:    Essential hypertension    Platelet inhibition due to Plavix    Renal insufficiency    Hiatal hernia      Plan:    · SBO  · Conservative treatment, pt appears to be improving and on surgical soft diet per primary team, pt tolerating PO and given pt lost IV access will continue to convert meds to PO    Mild vascular congestion   Off IVF, will restart lasix in AM    Possible aspiration pneumonia   Finished cefepime IV d 7/7 and 1/3 doses of IV flagyl   Switch to PO flagyl given improving oral intake for 2 doses     HTN   Continue PO metoprolol   Will restart diovan today   Continue to hold norvasc for now    CKD stage III   At BL    GERD   Switch to PO PPI    Hypernatremia   Resolved    Hypophosphatemia   repleted    Hypomagnesemia   repleted and improved    B/l paresthesias of fingers   Suspect 2* positional paresthesias   B12 normal, phosphorus improved, magnesium still somewhat low but improved   Calcium levels have normalized      Dispo:  No barriers for d/c anticipated from SLIM perspective    VTE Pharmacologic Prophylaxis:   Pharmacologic: Heparin  Mechanical VTE Prophylaxis in Place: Yes    Patient Centered Rounds: I have performed bedside rounds with nursing staff today  Discussions with Specialists or Other Care Team Provider:     Education and Discussions with Family / Patient:     Time Spent for Care: 30 minutes  More than 50% of total time spent on counseling and coordination of care as described above      Current Length of Stay: 6 day(s)    Current Patient Status: Inpatient     Discharge Plan / Estimated Discharge Date:     Code Status: Level 3 - DNAR and DNI      Subjective:   Pt reports her appetite has improved somewhat  She is passing gas  She reports she still coughs some primarily w/fluid intake  No sob  No fevers overnight  Objective:     Vitals:   Temp (24hrs), Av 1 °F (35 6 °C), Min:94 5 °F (34 7 °C), Max:97 4 °F (36 3 °C)    HR:  [66-95] 66  Resp:  [16-18] 16  BP: (140-187)/(70-89) 140/80  SpO2:  [96 %] 96 %  Body mass index is 21 4 kg/m²  Input and Output Summary (last 24 hours): Intake/Output Summary (Last 24 hours) at 10/11/17 1333  Last data filed at 10/10/17 1700   Gross per 24 hour   Intake              120 ml   Output                0 ml   Net              120 ml       Physical Exam:     Physical Exam   Constitutional: She appears well-developed and well-nourished  No distress  HENT:   Head: Normocephalic and atraumatic  Right Ear: External ear normal    Mouth/Throat: Oropharynx is clear and moist  No oropharyngeal exudate  Eyes: Conjunctivae are normal  Right eye exhibits no discharge  Left eye exhibits no discharge  No scleral icterus  Cardiovascular: Normal rate, regular rhythm, normal heart sounds and intact distal pulses  Exam reveals no gallop and no friction rub  No murmur heard  Pulmonary/Chest: Effort normal  No respiratory distress  She has no wheezes  She has rales (RLL fine rales, left mid fine rales)  She exhibits no tenderness  Abdominal: Soft  She exhibits no distension  There is no tenderness  There is no rebound and no guarding  Musculoskeletal: She exhibits edema (trace non pitting edema)  Lymphadenopathy:     She has no cervical adenopathy  Neurological: She is alert  Skin: Skin is warm and dry  She is not diaphoretic  Purpura over dorsum hands b/l     Psychiatric: She has a normal mood and affect  Vitals reviewed        (  Be Sure to Include Physical Exam: Delete this entire line when you have entered your exam)    Additional Data:     Labs:      Results from last 7 days  Lab Units 10/10/17  0715 10/09/17  0620 10/07/17  0537   WBC Thousand/uL 10 87* 9 13 7 37   HEMOGLOBIN g/dL 11 5 10 5* 11 2*   HEMATOCRIT % 35 8 34 1* 35 5   PLATELETS Thousands/uL 241 182 201   NEUTROS PCT %  --   --  79*   LYMPHS PCT %  --   --  10*   LYMPHO PCT %  --  16  --    MONOS PCT %  --   --  11   MONO PCT MAN %  --  4  --    EOS PCT %  --   --  0   EOSINO PCT MANUAL %  --  1  --        Results from last 7 days  Lab Units 10/11/17  0609  10/09/17  0620   SODIUM mmol/L 138  < > 148*   POTASSIUM mmol/L 5 1  < > 4 8   CHLORIDE mmol/L 111*  < > 120*   CO2 mmol/L 20*  < > 21   BUN mg/dL 13  < > 13   CREATININE mg/dL 1 09  < > 1 10   CALCIUM mg/dL 8 3  < > 7 9*   TOTAL PROTEIN g/dL  --   --  4 9*   BILIRUBIN TOTAL mg/dL  --   --  0 21   ALK PHOS U/L  --   --  44*   ALT U/L  --   --  <6*   AST U/L  --   --  10   GLUCOSE RANDOM mg/dL 119  < > 148*   < > = values in this interval not displayed  * I Have Reviewed All Lab Data Listed Above  * Additional Pertinent Lab Tests Reviewed: [address] Admission Reviewed    Imaging:    Imaging Reports Reviewed Today Include:   Imaging Personally Reviewed by Myself Includes:      Recent Cultures (last 7 days):           Last 24 Hours Medication List:     cefepime 1,000 mg Intravenous Q24H   heparin (porcine) 5,000 Units Subcutaneous Q8H [address]   hydrALAZINE 10 mg Intravenous Once   metoprolol tartrate 50 mg Oral Q12H WILLIAM   metroNIDAZOLE 500 mg Oral Q8H WILLIAM   pantoprazole 40 mg Intravenous Daily   timolol 1 drop Both Eyes Daily        Today, Patient Was Seen By: Jesu Bray PA-C    ** Please Note: This note has been constructed using a voice recognition system   **

## 2017-10-11 NOTE — PROGRESS NOTES
Progress Note - General Surgery   Mark Grandchild 80 y o  female MRN: [id number]  Unit/Bed#: [place] [address] -01 Encounter: [id number]      Subjective/Objective     Subjective: The patient is doing well  Tolerating her diet  Denies nausea vomiting  Objective:     BP (!) 174/79   Pulse 66   Temp (!) 95 8 °F (35 4 °C) (Tympanic)   Resp 18   Ht 5' (1 524 m)   Wt 49 7 kg (109 lb 9 1 oz)   SpO2 98%   BMI 21 40 kg/m²       Intake/Output Summary (Last 24 hours) at 10/11/17 1632  Last data filed at 10/10/17 1700   Gross per 24 hour   Intake              120 ml   Output                0 ml   Net              120 ml         Physical Exam:       General Appearance:    Alert, cooperative, no distress, appears stated age   Head:    Normocephalic, without obvious abnormality, atraumatic   Nose:   Nares normal   Throat:   Lips, mucosa normal, pharynx clear   Neck:   Supple, symmetrical   Chest/Breast:   Def   Lungs:     Clear to auscultation bilaterally, respirations unlabored   Heart:    Regular rate and rhythm, S1 and S2 normal, no murmur, rub    or gallop   Abdomen:     Soft, non-tender, non distended       Genitalia:    Def   Rectal:    Def   Extremities:   Extremities normal, atraumatic, no cyanosis or edema   Skin:   Skin color, texture, turgor normal, no rashes or lesions   Neurologic:                    Lab, Imaging and other studies:I have personally reviewed pertinent lab results  Labs in chart were reviewed      VTE Pharmacologic Prophylaxis: Heparin  VTE Mechanical Prophylaxis: sequential compression device    Assessment/Plan:  Partial small-bowel obstruction versus ileus - resolving  Tolerating surgical soft  PT evaluation appreciated - patient may need short-term rehab due to deconditioning    Possible aspiration pneumonia continue antibiotics    DC planning - likely tomorrow    Patient Active Problem List   Diagnosis    Other partial intestinal obstruction    Essential hypertension    Platelet inhibition due to Plavix    Renal insufficiency    Hiatal hernia            This text is generated with voice recognition software  There may be translation, syntax,  or grammatical errors  If you have any questions, please contact the dictating provider

## 2017-10-12 PROBLEM — K56.0 PARALYTIC ILEUS (HCC): Status: ACTIVE | Noted: 2017-10-04

## 2017-10-12 LAB — CA-I BLD-SCNC: 1.15 MMOL/L (ref 1.12–1.32)

## 2017-10-12 PROCEDURE — 97530 THERAPEUTIC ACTIVITIES: CPT

## 2017-10-12 PROCEDURE — 82330 ASSAY OF CALCIUM: CPT | Performed by: INTERNAL MEDICINE

## 2017-10-12 PROCEDURE — 97110 THERAPEUTIC EXERCISES: CPT

## 2017-10-12 RX ADMIN — METOPROLOL TARTRATE 50 MG: 50 TABLET ORAL at 09:45

## 2017-10-12 RX ADMIN — VALSARTAN 320 MG: 160 TABLET ORAL at 09:45

## 2017-10-12 RX ADMIN — PANTOPRAZOLE SODIUM 40 MG: 40 TABLET, DELAYED RELEASE ORAL at 05:56

## 2017-10-12 RX ADMIN — FUROSEMIDE 20 MG: 20 TABLET ORAL at 09:45

## 2017-10-12 RX ADMIN — TIMOLOL MALEATE 1 DROP: 5 SOLUTION/ DROPS OPHTHALMIC at 09:45

## 2017-10-12 RX ADMIN — AMLODIPINE BESYLATE 5 MG: 5 TABLET ORAL at 09:45

## 2017-10-12 RX ADMIN — HEPARIN SODIUM 5000 UNITS: 5000 INJECTION, SOLUTION INTRAVENOUS; SUBCUTANEOUS at 14:56

## 2017-10-12 NOTE — SOCIAL WORK
IMM notice presented to the patient, patient's POA signed the notice, & I faxed it to [facility]/ [facility]

## 2017-10-12 NOTE — PROGRESS NOTES
Progress Note - General Surgery   Aixa Porter 80 y o  female MRN: [id number]  Unit/Bed#: [place] [address] -01 Encounter: [id number]      Subjective/Objective     Subjective: The patient is doing well  No complaint  Objective:     /93   Pulse 62   Temp 98 1 °F (36 7 °C) (Tympanic)   Resp 16   Ht 5' (1 524 m)   Wt 53 3 kg (117 lb 8 1 oz)   SpO2 96%   BMI 22 95 kg/m²     No intake or output data in the 24 hours ending 10/12/17 1805      Physical Exam:       General Appearance:    Alert, cooperative, no distress, appears stated age   Head:    Normocephalic, without obvious abnormality, atraumatic   Nose:   Nares normal   Throat:   Lips, mucosa normal, pharynx clear   Neck:   Supple, symmetrical   Chest/Breast:   Def   Lungs:     Clear to auscultation bilaterally, respirations unlabored   Heart:    Regular rate and rhythm, S1 and S2 normal, no murmur, rub    or gallop   Abdomen:     Soft, non-tender, non distended       Genitalia:    Def   Rectal:    Def   Extremities:   Extremities normal, atraumatic, no cyanosis or edema   Skin:   Skin color, texture, turgor normal, no rashes or lesions   Neurologic:                    Lab, Imaging and other studies:I have personally reviewed pertinent lab results  Labs in chart were reviewed  VTE Pharmacologic Prophylaxis: Heparin  VTE Mechanical Prophylaxis: sequential compression device    Assessment/Plan:  Partial small-bowel obstruction versus ileus - resolving  Tolerating surgical soft  PT evaluation appreciated - patient may need short-term rehab due to deconditioning    Antibiotics complete    DC planning -once bed available  Patient Active Problem List   Diagnosis    Paralytic ileus ([address] )    Essential hypertension    Platelet inhibition due to Plavix    Renal insufficiency    Hiatal hernia            This text is generated with voice recognition software  There may be translation, syntax,  or grammatical errors   If you have any questions, please contact the dictating provider

## 2017-10-12 NOTE — PLAN OF CARE
Problem: Potential for Falls  Goal: Patient will remain free of falls  INTERVENTIONS:  - Assess patient frequently for physical needs  -  Identify cognitive and physical deficits and behaviors that affect risk of falls    -  [place] fall precautions as indicated by assessment   - Educate patient/family on patient safety including physical limitations  - Instruct patient to call for assistance with activity based on assessment  - Modify environment to reduce risk of injury  - Consider OT/PT consult to assist with strengthening/mobility   Outcome: Progressing      Problem: Prexisting or High Potential for Compromised Skin Integrity  Goal: Skin integrity is maintained or improved  INTERVENTIONS:  - Identify patients at risk for skin breakdown  - Assess and monitor skin integrity  - Assess and monitor nutrition and hydration status  - Monitor labs (i e  albumin)  - Assess for incontinence   - Turn and reposition patient  - Assist with mobility/ambulation  - Relieve pressure over bony prominences  - Avoid friction and shearing  - Provide appropriate hygiene as needed including keeping skin clean and dry  - Evaluate need for skin moisturizer/barrier cream  - Collaborate with interdisciplinary team (i e  Nutrition, Rehabilitation, etc )   - Patient/family teaching   Outcome: Progressing      Problem: PAIN - ADULT  Goal: Verbalizes/displays adequate comfort level or baseline comfort level  Interventions:  - Encourage patient to monitor pain and request assistance  - Assess pain using appropriate pain scale  - Administer analgesics based on type and severity of pain and evaluate response  - Implement non-pharmacological measures as appropriate and evaluate response  - Consider cultural and social influences on pain and pain management  - Notify physician/advanced practitioner if interventions unsuccessful or patient reports new pain   Outcome: Progressing      Problem: INFECTION - ADULT  Goal: Absence or prevention of progression during hospitalization  INTERVENTIONS:  - Assess and monitor for signs and symptoms of infection  - Monitor lab/diagnostic results  - Monitor all insertion sites, i e  indwelling lines, tubes, and drains  - Monitor endotracheal (as able) and nasal secretions for changes in amount and color  - [place] appropriate cooling/warming therapies per order  - Administer medications as ordered  - Instruct and encourage patient and family to use good hand hygiene technique  - Identify and instruct in appropriate isolation precautions for identified infection/condition   Outcome: Progressing    Goal: Absence of fever/infection during neutropenic period  INTERVENTIONS:  - Monitor WBC  - Implement neutropenic guidelines   Outcome: Progressing      Problem: SAFETY ADULT  Goal: Maintain or return to baseline ADL function  INTERVENTIONS:  -  Assess patient's ability to carry out ADLs; assess patient's baseline for ADL function and identify physical deficits which impact ability to perform ADLs (bathing, care of mouth/teeth, toileting, grooming, dressing, etc )  - Assess/evaluate cause of self-care deficits   - Assess range of motion  - Assess patient's mobility; develop plan if impaired  - Assess patient's need for assistive devices and provide as appropriate  - Encourage maximum independence but intervene and supervise when necessary  ¯ Involve family in performance of ADLs  ¯ Assess for home care needs following discharge   ¯ Request OT consult to assist with ADL evaluation and planning for discharge  ¯ Provide patient education as appropriate   Outcome: Progressing    Goal: Maintain or return mobility status to optimal level  INTERVENTIONS:  - Assess patient's baseline mobility status (ambulation, transfers, stairs, etc )    - Identify cognitive and physical deficits and behaviors that affect mobility  - Identify mobility aids required to assist with transfers and/or ambulation (gait belt, sit-to-stand, lift, walker, cane, etc )  - [place] fall precautions as indicated by assessment  - Record patient progress and toleration of activity level on Mobility SBAR; progress patient to next Phase/Stage  - Instruct patient to call for assistance with activity based on assessment  - Request Rehabilitation consult to assist with strengthening/weightbearing, etc    Outcome: Progressing      Problem: DISCHARGE PLANNING  Goal: Discharge to home or other facility with appropriate resources  INTERVENTIONS:  - Identify barriers to discharge w/patient and caregiver  - Arrange for needed discharge resources and transportation as appropriate  - Identify discharge learning needs (meds, wound care, etc )  - Arrange for interpretive services to assist at discharge as needed  - Refer to Case Management Department for coordinating discharge planning if the patient needs post-hospital services based on physician/advanced practitioner order or complex needs related to functional status, cognitive ability, or social support system   Outcome: Progressing      Problem: Knowledge Deficit  Goal: Patient/family/caregiver demonstrates understanding of disease process, treatment plan, medications, and discharge instructions  Complete learning assessment and assess knowledge base    Interventions:  - Provide teaching at level of understanding  - Provide teaching via preferred learning methods   Outcome: Progressing      Problem: GASTROINTESTINAL - ADULT  Goal: Minimal or absence of nausea and/or vomiting  INTERVENTIONS:  - Administer IV fluids as ordered to ensure adequate hydration  - Maintain NPO status until nausea and vomiting are resolved  - Nasogastric tube as ordered  - Administer ordered antiemetic medications as needed  - Provide nonpharmacologic comfort measures as appropriate  - Advance diet as tolerated, if ordered  - Nutrition services referral to assist patient with adequate nutrition and appropriate food choices   Outcome: Progressing    Goal: Maintains or returns to baseline bowel function  INTERVENTIONS:  - Assess bowel function  - Encourage oral fluids to ensure adequate hydration  - Administer IV fluids as ordered to ensure adequate hydration  - Administer ordered medications as needed  - Encourage mobilization and activity  - Nutrition services referral to assist patient with appropriate food choices   Outcome: Progressing    Goal: Maintains adequate nutritional intake  INTERVENTIONS:  - Monitor percentage of each meal consumed  - Identify factors contributing to decreased intake, treat as appropriate  - Assist with meals as needed  - Monitor I&O, WT and lab values  - Obtain nutrition services referral as needed   Outcome: Progressing      Problem: DISCHARGE PLANNING - CARE MANAGEMENT  Goal: Discharge to post-acute care or home with appropriate resources  INTERVENTIONS:  - Conduct assessment to determine patient/family and health care team treatment goals, and need for post-acute services based on payer coverage, community resources, and patient preferences, and barriers to discharge  - Address psychosocial, clinical, and financial barriers to discharge as identified in assessment in conjunction with the patient/family and health care team  - Arrange appropriate level of post-acute services according to patient's   needs and preference and payer coverage in collaboration with the physician and health care team  - Communicate with and update the patient/family, physician, and health care team regarding progress on the discharge plan  - Arrange appropriate transportation to post-acute venues   Outcome: Progressing      Problem: Nutrition/Hydration-ADULT  Goal: Nutrient/Hydration intake appropriate for improving, restoring or maintaining nutritional needs  Monitor and assess patient's nutrition/hydration status for malnutrition (ex- brittle hair, bruises, dry skin, pale skin and conjunctiva, muscle wasting, smooth red tongue, and disorientation)  Collaborate with interdisciplinary team and initiate plan and interventions as ordered  Monitor patient's weight and dietary intake as ordered or per policy  Utilize nutrition screening tool and intervene per policy  Determine patient's food preferences and provide high-protein, high-caloric foods as appropriate       INTERVENTIONS:  - Monitor oral intake, urinary output, labs, and treatment plans  - Assess nutrition and hydration status and recommend course of action  - Evaluate amount of meals eaten  - Assist patient with eating if necessary   - Allow adequate time for meals  - Recommend/ encourage appropriate diets, oral nutritional supplements, and vitamin/mineral supplements  - Order, calculate, and assess calorie counts as needed  - Recommend, monitor, and adjust tube feedings and TPN/PPN based on assessed needs  - Assess need for intravenous fluids  - Provide specific nutrition/hydration education as appropriate  - Include patient/family/caregiver in decisions related to nutrition   Outcome: Progressing      Problem: SKIN/TISSUE INTEGRITY - ADULT  Goal: Skin integrity remains intact  INTERVENTIONS  - Identify patients at risk for skin breakdown  - Assess and monitor skin integrity  - Assess and monitor nutrition and hydration status  - Monitor labs (i e  albumin)  - Assess for incontinence   - Turn and reposition patient  - Assist with mobility/ambulation  - Relieve pressure over bony prominences  - Avoid friction and shearing  - Provide appropriate hygiene as needed including keeping skin clean and dry  - Evaluate need for skin moisturizer/barrier cream  - Collaborate with interdisciplinary team (i e  Nutrition, Rehabilitation, etc )   - Patient/family teaching  Outcome: Progressing

## 2017-10-12 NOTE — PLAN OF CARE
Problem: PHYSICAL THERAPY ADULT  Goal: Performs mobility at highest level of function for planned discharge setting  See evaluation for individualized goals  Treatment/Interventions: Therapeutic exercise, LE strengthening/ROM, Continued evaluation, Spoke to nursing, Family          See flowsheet documentation for full assessment, interventions and recommendations  Outcome: Progressing  Prognosis: Fair  Problem List: Decreased range of motion, Decreased strength, Impaired balance, Decreased endurance, Decreased cognition, Impaired judgement, Impaired vision  Assessment: Pt  supine in bed upon my arrival  Reports just returned to bed several minutes ago from chair, requesting to remain in bed  Performance of HEP supine in bed with A of therapist for successful completion  Repositioned supine in bed at end of treatment session  At this time upon d/c STR vs  PCF depending on level of care able to provide with Home PT provided when medcially stable  Barriers to Discharge: None     Recommendation: Short-term skilled PT, Home PT (STR vs  PCF depending on level of care able to provide)     PT - OK to Discharge: Yes (if d/c when medically stable )    See flowsheet documentation for full assessment

## 2017-10-12 NOTE — PROGRESS NOTES
Progress Note -  Internal Medicine / Hospitalists  Haylie Espinoza 80 y o  female MRN: [id number]  Unit/Bed#: [place] [address] -01 Encounter: [id number]      ASSESSMENT AND PLAN:    1  Small-bowel obstruction:  Conservative treatment per primary service  Tolerating soft surgical diet  Tolerating p o  medication  Serial abdominal exams  2   Possible aspiration pneumonia:  Completed 7 day course of cefepime  Day 6/7 course of Flagyl  3   Mild vascular congestion:  Improved with IV Lasix and DC have intravenous fluid hydration  Continue home Lasix 20 mg daily  4   Hypertension: -174  Continue amlodipine 5 mg, metoprolol 50 mg q 12 hours, and valsartan 320 mg  Monitor  May increase the amlodipine as needed tomorrow  5   CKD stage 3:  Creatinine per baseline  6   GERD:  Continue PPI    7  Bilateral paresthesias of the fingers:  Stable  Likely secondary to positional paresthesias  B12, phosphorus, magnesium stable  Calcium levels stable  8   Hyponatremia:  Resolved    9  Hypophosphatemia:  Repleted    10  Hypomagnesemia:  Repeated    11  Nutritional intake: Tolerating surgical soft diet  Will change to a dental soft and add Ensure  Plan to contact surgery regarding disposition to rehab     ______________________________________________________________________    SUBJECTIVE:   Patient seen and examined  Patient states she is doing well  Tolerating diet  She ate oatmeal and a banana today  Mild indigestion following banana intake  Patient notes weakness  Unable to lift herself from a seated position  Bowels are persistently loose per baseline  Patient's POA at bedside expresses concern for failure to thrive  She is requesting hospice consultation  REVIEW OF SYSTEMS:     General:   No Fever or chills; No significant weight loss or gain  EENT:   No ear pain, facial swelling; No sneezing, sore throat  Skin:   No rashes, color changes     Respiratory:     No shortness of breath, cough, wheezing, stridor  Cardiovascular:     No chest pain, palpitations  Gastrointestinal:    No nausea, vomiting, diarrhea; No abdominal pain  Musculoskeletal:     No arthralgias, myalgias, swelling  Neurologic:   No dizziness, numbness, weakness  No speech difficulties  Psych:   No agitation, suicidal ideations  Otherwise, All other twelve-point review of systems normal      OBJECTIVE:     Vitals:     HR:  [64-78] 64  Resp:  [16-18] 17  BP: (140-174)/(72-84) 168/84  SpO2:  [95 %-100 %] 100 %  Temp (24hrs), Av 8 °F (36 °C), Min:95 8 °F (35 4 °C), Max:97 4 °F (36 3 °C)  Current: Temperature: (!) 97 3 °F (36 3 °C)  No intake or output data in the 24 hours ending 10/12/17 1042    Physical Exam   Constitutional: She is oriented to person, place, and time  She appears cachectic  HENT:   Head: Normocephalic  Mouth/Throat: Oropharynx is clear and moist    Eyes: Conjunctivae and EOM are normal  Pupils are equal, round, and reactive to light  No scleral icterus  Neck: Normal range of motion  Neck supple  No JVD present  No thyromegaly present  Cardiovascular: Normal rate and regular rhythm  No murmur heard  Pulmonary/Chest: Effort normal and breath sounds normal    Slightly decreased at bases     Abdominal: Soft  Bowel sounds are normal  She exhibits no distension  There is no tenderness  There is no rebound  Musculoskeletal: Normal range of motion  She exhibits no edema or tenderness  Lymphadenopathy:     She has no cervical adenopathy  Neurological: She is alert and oriented to person, place, and time  No cranial nerve deficit  Skin: Skin is warm and dry  No rash noted  No erythema  Psychiatric: She has a normal mood and affect   Her behavior is normal  Thought content normal        Lab, Imaging and other studies:      Results from last 7 days  Lab Units 10/10/17  0715   WBC Thousand/uL 10 87*   HEMOGLOBIN g/dL 11 5   HEMATOCRIT % 35 8   PLATELETS Thousands/uL 241 Results from last 7 days  Lab Units 10/11/17  0609  10/09/17  0620   SODIUM mmol/L 138  < > 148*   POTASSIUM mmol/L 5 1  < > 4 8   CHLORIDE mmol/L 111*  < > 120*   CO2 mmol/L 20*  < > 21   BUN mg/dL 13  < > 13   CREATININE mg/dL 1 09  < > 1 10   CALCIUM mg/dL 8 3  < > 7 9*   TOTAL PROTEIN g/dL  --   --  4 9*   BILIRUBIN TOTAL mg/dL  --   --  0 21   ALK PHOS U/L  --   --  44*   ALT U/L  --   --  <6*   AST U/L  --   --  10   GLUCOSE RANDOM mg/dL 119  < > 148*   < > = values in this interval not displayed  Lab Results   Component Value Date    URINECX Culture results to follow  09/07/2017    URINECX >100,000 cfu/ml Pseudomonas aeruginosa 09/07/2017    URINECX 50,000-59,000 cfu/ml Escherichia coli 09/07/2017       Scheduled Meds:    amLODIPine 5 mg Oral Daily   furosemide 20 mg Oral Daily   heparin (porcine) 5,000 Units Subcutaneous Q8H [address]   hydrALAZINE 10 mg Intravenous Once   metoprolol tartrate 50 mg Oral Q12H WILLIAM   pantoprazole 40 mg Oral Early Morning   timolol 1 drop Both Eyes Daily   valsartan 320 mg Oral Daily       Continuous Infusions:       PRN Meds:  carbamide peroxide    hydrALAZINE    metoprolol    morphine injection    ondansetron    phenol    promethazine      VTE Pharmacologic Prophylaxis: Heparin Please note, management per Primary service/ we well document recommendation   VTE Mechanical Prophylaxis: sequential compression device      Discussions with Specialists or Other Care Team Provider:  Plan of care discussed with surgery and attending physician  Education and Discussions with Family / Patient:  Plan of care discussed with patient    Time Spent for Care: 32 minutes  More than 50% of total time spent on counseling and coordination of care as described above  Current Length of Stay: 7 day(s)  Current Patient Status: Inpatient     Today, Patient Was Seen By: [facility] [employer], [facility]      NOTE: This record was composed with voice recognition software   Occasional wrong word or similar sounding substitutions result do to limitations of voice recognition software  Use context where substitutions of wrong wording have occurred

## 2017-10-12 NOTE — PHYSICAL THERAPY NOTE
Physical Therapy Progress Note     10/12/17 1610   Pain Assessment   Pain Assessment No/denies pain   Pain Score No Pain   Restrictions/Precautions   Weight Bearing Precautions Per Order No   Other Precautions Fall Risk;Cognitive   General   Chart Reviewed Yes   Response to Previous Treatment Patient reporting fatigue but able to participate  Family/Caregiver Present No   Subjective   Subjective Willing to participate in therapy this PM    Endurance Deficit   Endurance Deficit Yes   Endurance Deficit Description fatigue/lethargic   Activity Tolerance   Activity Tolerance Patient limited by fatigue   Nurse Made Aware Yes   Exercises   THR Supine;10 reps;AAROM; Bilateral   Assessment   Prognosis Fair   Problem List Decreased range of motion;Decreased strength; Impaired balance;Decreased endurance;Decreased cognition; Impaired judgement; Impaired vision   Assessment Pt  supine in bed upon my arrival  Reports just returned to bed several minutes ago from chair, requesting to remain in bed  Performance of HEP supine in bed with A of therapist for successful completion  Repositioned supine in bed at end of treatment session  At this time upon d/c STR vs  PCF depending on level of care able to provide with Home PT provided when medcially stable  Goals   Patient Goals None stated  STG Expiration Date 10/16/17   Treatment Day 2   Plan   Treatment/Interventions Functional transfer training;LE strengthening/ROM; Therapeutic exercise; Endurance training;Bed mobility;Spoke to nursing;Spoke to case management   Progress Slow progress, decreased activity tolerance   PT Frequency 5x/wk   Recommendation   Recommendation Short-term skilled PT; Home PT  (STR vs  PCF depending on level of care able to provide)   PT - OK to Discharge Yes  (if d/c when medically stable )     Yamile Garcia PTA

## 2017-10-13 LAB
ANION GAP SERPL CALCULATED.3IONS-SCNC: 7 MMOL/L (ref 4–13)
BUN SERPL-MCNC: 19 MG/DL (ref 5–25)
CALCIUM SERPL-MCNC: 7.9 MG/DL (ref 8.3–10.1)
CHLORIDE SERPL-SCNC: 107 MMOL/L (ref 100–108)
CO2 SERPL-SCNC: 24 MMOL/L (ref 21–32)
CREAT SERPL-MCNC: 1.22 MG/DL (ref 0.6–1.3)
ERYTHROCYTE [DISTWIDTH] IN BLOOD BY AUTOMATED COUNT: 15.4 % (ref 11.6–15.1)
GFR SERPL CREATININE-BSD FRML MDRD: 37 ML/MIN/1.73SQ M
GLUCOSE SERPL-MCNC: 144 MG/DL (ref 65–140)
HCT VFR BLD AUTO: 33.8 % (ref 34.8–46.1)
HGB BLD-MCNC: 10.9 G/DL (ref 11.5–15.4)
MAGNESIUM SERPL-MCNC: 1.4 MG/DL (ref 1.6–2.6)
MCH RBC QN AUTO: 29.9 PG (ref 26.8–34.3)
MCHC RBC AUTO-ENTMCNC: 32.2 G/DL (ref 31.4–37.4)
MCV RBC AUTO: 93 FL (ref 82–98)
PHOSPHATE SERPL-MCNC: 3.3 MG/DL (ref 2.3–4.1)
PLATELET # BLD AUTO: 271 THOUSANDS/UL (ref 149–390)
PMV BLD AUTO: 10.2 FL (ref 8.9–12.7)
POTASSIUM SERPL-SCNC: 4.1 MMOL/L (ref 3.5–5.3)
RBC # BLD AUTO: 3.64 MILLION/UL (ref 3.81–5.12)
SODIUM SERPL-SCNC: 138 MMOL/L (ref 136–145)
WBC # BLD AUTO: 13.82 THOUSAND/UL (ref 4.31–10.16)

## 2017-10-13 PROCEDURE — 97110 THERAPEUTIC EXERCISES: CPT

## 2017-10-13 PROCEDURE — 83735 ASSAY OF MAGNESIUM: CPT | Performed by: PHYSICIAN ASSISTANT

## 2017-10-13 PROCEDURE — 80048 BASIC METABOLIC PNL TOTAL CA: CPT | Performed by: PHYSICIAN ASSISTANT

## 2017-10-13 PROCEDURE — 85027 COMPLETE CBC AUTOMATED: CPT | Performed by: PHYSICIAN ASSISTANT

## 2017-10-13 PROCEDURE — 84100 ASSAY OF PHOSPHORUS: CPT | Performed by: PHYSICIAN ASSISTANT

## 2017-10-13 RX ORDER — AMLODIPINE BESYLATE 2.5 MG/1
2.5 TABLET ORAL DAILY
Status: DISCONTINUED | OUTPATIENT
Start: 2017-10-14 | End: 2017-10-14 | Stop reason: HOSPADM

## 2017-10-13 RX ORDER — OXYCODONE HCL 5 MG/5 ML
2 SOLUTION, ORAL ORAL EVERY 2 HOUR PRN
Status: DISCONTINUED | OUTPATIENT
Start: 2017-10-13 | End: 2017-10-14 | Stop reason: HOSPADM

## 2017-10-13 RX ADMIN — METOPROLOL TARTRATE 50 MG: 50 TABLET ORAL at 09:11

## 2017-10-13 RX ADMIN — AMLODIPINE BESYLATE 5 MG: 5 TABLET ORAL at 09:11

## 2017-10-13 RX ADMIN — TIMOLOL MALEATE 1 DROP: 5 SOLUTION/ DROPS OPHTHALMIC at 09:11

## 2017-10-13 RX ADMIN — METOPROLOL TARTRATE 50 MG: 50 TABLET ORAL at 22:00

## 2017-10-13 RX ADMIN — METOPROLOL TARTRATE 50 MG: 50 TABLET ORAL at 00:06

## 2017-10-13 RX ADMIN — HEPARIN SODIUM 5000 UNITS: 5000 INJECTION, SOLUTION INTRAVENOUS; SUBCUTANEOUS at 05:56

## 2017-10-13 RX ADMIN — HEPARIN SODIUM 5000 UNITS: 5000 INJECTION, SOLUTION INTRAVENOUS; SUBCUTANEOUS at 00:06

## 2017-10-13 RX ADMIN — FUROSEMIDE 20 MG: 20 TABLET ORAL at 09:11

## 2017-10-13 RX ADMIN — VALSARTAN 320 MG: 160 TABLET ORAL at 09:11

## 2017-10-13 NOTE — CASE MANAGEMENT
Continued Stay Review    Date: 10/13    Vital Signs: /58   Pulse 69   Temp 97 7 °F (36 5 °C) (Tympanic)   Resp 16   Ht 5' (1 524 m)   Wt 55 3 kg (121 lb 14 6 oz)   SpO2 93%   BMI 23 81 kg/m²     Medications:   Scheduled Meds:   amLODIPine 5 mg Oral Daily   furosemide 20 mg Oral Daily   heparin (porcine) 5,000 Units Subcutaneous Q8H [address]   hydrALAZINE 10 mg Intravenous Once   metoprolol tartrate 50 mg Oral Q12H WILLIAM   pantoprazole 40 mg Oral Early Morning   timolol 1 drop Both Eyes Daily   valsartan 320 mg Oral Daily     Continuous Infusions:    PRN Meds: carbamide peroxide    hydrALAZINE    metoprolol    morphine injection    ondansetron    phenol    promethazine    Abnormal Labs/Diagnostic Results: Results from last 7 days  Lab Units 10/13/17  0454 10/10/17  0715 10/09/17  0620   WBC Thousand/uL 13 82* 10 87* 9 13   HEMOGLOBIN g/dL 10 9* 11 5 10 5*   HEMATOCRIT % 33 8* 35 8 34 1*   PLATELETS Thousands/uL 271 241 182                   Results from last 7 days  Lab Units 10/13/17  0454 10/11/17  0609 10/10/17  0715 10/09/17  0620   SODIUM mmol/L 138 138 143 148*   POTASSIUM mmol/L 4 1 5 1 4 2 4 8   CHLORIDE mmol/L 107 111* 115* 120*   CO2 mmol/L 24 20* 20* 21   BUN mg/dL 19 13 12 13   CREATININE mg/dL 1 22 1 09 1 18 1 10   CALCIUM mg/dL 7 9* 8 3 8 1* 7 9*   TOTAL PROTEIN g/dL  --   --   --  4 9*   BILIRUBIN TOTAL mg/dL  --   --   --  0 21   ALK PHOS U/L  --   --   --  44*   ALT U/L  --   --   --  <6*   AST U/L  --   --   --  10   GLUCOSE RANDOM mg/dL 144* 119 112 148*             Age/Sex: 80 y o  female     Assessment/Plan:   General surgical progress note  10/13  Paralytic ileus [facility])   Assessment & Plan                               Assessment: Total Paralytic Ileus - now resolved                            Plan: Continue surgical soft  Plan D/C, depending on family wishing to pursue care and short term rehabilitation or transition to hospice   Will call to d/w Jayshree zelaya today            Discharge Plan: TBD

## 2017-10-13 NOTE — PLAN OF CARE
Problem: DISCHARGE PLANNING - CARE MANAGEMENT  Goal: Discharge to post-acute care or home with appropriate resources  INTERVENTIONS:  - Conduct assessment to determine patient/family and health care team treatment goals, and need for post-acute services based on payer coverage, community resources, and patient preferences, and barriers to discharge  - Address psychosocial, clinical, and financial barriers to discharge as identified in assessment in conjunction with the patient/family and health care team  - Arrange appropriate level of post-acute services according to patient's   needs and preference and payer coverage in collaboration with the physician and health care team  - Communicate with and update the patient/family, physician, and health care team regarding progress on the discharge plan  - Arrange appropriate transportation to post-acute venues   Outcome: Progressing  Referral made to [facility]; clinical faxed to [facility];

## 2017-10-13 NOTE — PROGRESS NOTES
Progress Note - Malissa Hoffman 80 y o  female MRN: [id number]    Unit/Bed#: [place] [address] -01 Encounter: [id number]        * Paralytic ileus [facility])   Assessment & Plan       Assessment: Total Paralytic Ileus - now resolved     Plan: Continue surgical soft  Plan D/C, depending on family wishing to pursue care and short term rehabilitation or transition to hospice  Will call to d/w Salomon zelaya today  Dr Ledesma Courser of [address]  Will d/w oliva Terry PA-C  Date: 10/13/2017 Time: 2:11 PM   Pager: [phone]      Subjective:  "I feel OK, my belly doesn't hurt today "  No nausea or vomiting  Consuming <50% a tray of surgical soft, but tolerating this with + BM  No complaints currently  Slightly confused about recent events  Objective:    Vitals:   Mvitals    Vitals:    10/12/17 2305 10/13/17 0006 10/13/17 0540 10/13/17 0700   BP: 117/57 117/57  116/65   Pulse: 70 70  66   Resp: 16   17   Temp: 98 2 °F (36 8 °C)   97 5 °F (36 4 °C)   TempSrc: Tympanic   Tympanic   SpO2: 96%   94%   Weight:   55 3 kg (121 lb 14 6 oz)    Height:         Vitals  Vitals:    10/13/17 0700   BP: 116/65   Pulse: 66   Resp: 17   Temp: 97 5 °F (36 4 °C)   SpO2: 94%         I/O:   Complete  I/O       10/11 0701 - 10/12 0700 10/12 0701 - 10/13 0700 10/13 0701 - 10/14 0700    P  O        I V  (mL/kg)       Total Intake(mL/kg)       Net                 Unmeasured Urine Occurrence 2 x 3 x           Labs:    Results from last 7 days  Lab Units 10/13/17  0454 10/10/17  0715 10/09/17  0620   WBC Thousand/uL 13 82* 10 87* 9 13   HEMOGLOBIN g/dL 10 9* 11 5 10 5*   HEMATOCRIT % 33 8* 35 8 34 1*   PLATELETS Thousands/uL 271 241 182               Results from last 7 days  Lab Units 10/13/17  0454 10/11/17  0609 10/10/17  0715 10/09/17  0620   SODIUM mmol/L 138 138 143 148*   POTASSIUM mmol/L 4 1 5 1 4 2 4 8   CHLORIDE mmol/L 107 111* 115* 120*   CO2 mmol/L 24 20* 20* 21   BUN mg/dL 19 13 12 13   CREATININE mg/dL 1  22 1 09 1 18 1 10   CALCIUM mg/dL 7 9* 8 3 8 1* 7 9*   TOTAL PROTEIN g/dL  --   --   --  4 9*   BILIRUBIN TOTAL mg/dL  --   --   --  0 21   ALK PHOS U/L  --   --   --  44*   ALT U/L  --   --   --  <6*   AST U/L  --   --   --  10   GLUCOSE RANDOM mg/dL 144* 119 112 148*       Exam:  General appearance: normal, fatigued, cooperative, cachectic and frail, elderly, AO to person, no complaints at the time of examiniation  Lungs: clear to auscultation bilaterally  Heart: regular rate and rhythm  Abdomen: nontender with no significant tenderness, rebound, or guarding, normal active bowel sounds  No rigidity, rebound, or guarding         Roxana Ortega PA-C  Date: 10/13/2017 Time: 2:04 PM   Pager: [phone]

## 2017-10-13 NOTE — TREATMENT PLAN
Treatment Plan - General Surgery   Jony Parkinson 80 y o  female MRN: [id number]    Plan:   Lengthy discussion with Dr Dora Anaya (niece of Chelle) and Chelle  Wishes to proceed with Hospice referral for returning home to [facility] and Hospice care here as an inpatient  Will order consult to Palliative care and Case Management consult for Hospice Referral  Dr Feliz Merlin aware  Dr Heraclio Martinez to modulate orders  Pavel Lozada aware  Zoe Dia will facilitate [facility] referral and coordinate with [facility]       Signature:   Valencia Ruiz PA-C  Date: 10/13/2017 Time: 2:49 PM

## 2017-10-13 NOTE — PHYSICAL THERAPY NOTE
PT Progress Note (13min)  (15:00-15:13)       10/13/17 1513   Pain Assessment   Pain Assessment No/denies pain   Restrictions/Precautions   Other Precautions Cognitive; Bed Alarm; Fall Risk   General   Chart Reviewed Yes   Response to Previous Treatment Patient with no complaints from previous session  Family/Caregiver Present No   Cognition   Orientation Level Oriented to person;Oriented to place   Subjective   Subjective pt resting in bed upon arrival  per nsg, pt OOB in chair in am  pt reports, "I'm so tired  I didn't get much sleep"  agreed to bed level ther ex  Activity Tolerance   Activity Tolerance Patient limited by fatigue   Nurse Made Aware Self   Exercises   Quad Sets Supine;10 reps;AROM; Bilateral   Heelslides Supine;10 reps;AROM; Bilateral   Hip Abduction Sitting;10 reps;AROM; Bilateral   Knee AROM Short Arc Quad Sitting;10 reps;AROM; Bilateral   Ankle Pumps Sitting;10 reps;AROM; Bilateral   Assessment   Prognosis Fair   Problem List Decreased strength;Decreased endurance; Impaired balance;Decreased mobility; Decreased cognition;Decreased safety awareness; Impaired vision; Impaired judgement   Assessment pt reports increased fatigue, however agreeable to bed level ther ex  OOB in chair ealier in am c (A) from nsg staff  performed supine AROM ther ex B/L LE x10 reps c min verbal cues for technique  will cont skilled PT to further maximize functional mobility + improve quality of life  upon d/c, recommend STR if PCF unable to accept pt at current level of mobility  Barriers to Discharge None   Goals   Patient Goals none stated   STG Expiration Date 10/16/17   Treatment Day 3   Plan   Treatment/Interventions Functional transfer training;LE strengthening/ROM; Therapeutic exercise; Endurance training;Patient/family training;Bed mobility;Gait training;Spoke to nursing   Progress Slow progress, decreased activity tolerance   PT Frequency 5x/wk   Recommendation   Recommendation Short-term skilled PT; Home PT  (pending facility's ability to accept pt at currently level)   PT - OK to Discharge Yes     Collerubi Nunez, PT

## 2017-10-13 NOTE — PLAN OF CARE
Problem: PHYSICAL THERAPY ADULT  Goal: Performs mobility at highest level of function for planned discharge setting  See evaluation for individualized goals  Treatment/Interventions: Therapeutic exercise, LE strengthening/ROM, Continued evaluation, Spoke to nursing, Family          See flowsheet documentation for full assessment, interventions and recommendations  Outcome: Progressing  Prognosis: Fair  Problem List: Decreased strength, Decreased endurance, Impaired balance, Decreased mobility, Decreased cognition, Decreased safety awareness, Impaired vision, Impaired judgement  Assessment: pt reports increased fatigue, however agreeable to bed level ther ex  OOB in chair ealier in am c (A) from [facility] staff  performed supine AROM ther ex B/L LE x10 reps c min verbal cues for technique  will cont skilled PT to further maximize functional mobility + improve quality of life  upon d/c, recommend STR if PCF unable to accept pt at current level of mobility  Barriers to Discharge: None     Recommendation: Short-term skilled PT, Home PT (pending facility's ability to accept pt at currently level)     PT - OK to Discharge: Yes    See flowsheet documentation for full assessment

## 2017-10-13 NOTE — ASSESSMENT & PLAN NOTE
Assessment: Total Paralytic Ileus - now resolved     Plan: Continue surgical soft  Plan D/C, depending on family wishing to pursue care and short term rehabilitation or transition to hospice  Will call to d/w Konstantin zelaya today

## 2017-10-13 NOTE — SOCIAL WORK
Several conversations with Dr Christian Ramirez PA-C, and garcia, [facility] about hospice care vs rehab at [facility]  Decision was made for patient to return to [facility] on Hospice care; family requested [facility] since that's the agency that [facility] uses  Spoke with Chaz Yousif, administration at [facility]  Hospice order faxed to Chaz Yousif and Holley Goff at [facility]  Clinical also faxed to Holley Goff at [facility] hospice RN will see patient here at the hospital and continue services when patient transfer back to [facility]  Chaz Yousif working to arrange patient's room at [facility] as she will be moved to a different room  Patient will be transferred back to [facility] once her room is ready  CM will notify [facility] and [facility] when patient will be transferred

## 2017-10-13 NOTE — PALLIATIVE CARE CONFERENCE
Brief Palliative and Supportive Care note:     Met with pt and her agent, niece Shanda Wiseman, at bedside today with BRAULIO Go and LOUIE Trejo from attending surgical service  Described her current health status and explored her wishes for her cares  Identified -- in general -- a track for treatment or a track for comfort; one in hospital and one at home  Clearly identified that non-treatment may allow a shorter life, but this would be focused on comfort  Without further prompting, coaching, nor suggestion, the pt volunteered that she does not wish to prolong her time, and that she understood what we had hinted towards  I then specifically offered hospice as a mode of care to keep her in her home at [facility], manage her pain and symptoms, and avoid further hospital stays  She expressed understanding and acceptance of this, but did identify that she does not like to make decisions of such a nature  She immediately and clearly deflected to her niece, who was very supportive and encouraging of pt's preference  We agreed to transition to comfort cares in hospital, with a plan to D/C to [facility] with [facility] hospice servicing the pt's needs upon arrival   All parties expressed acceptance of pt's mortality, and thankfulness for her improvement in bowel function  Please call our on call provider if there are new symptom control needs on the weekend      Wenceslao Pierce MD  Palliative and Supportive Care  Call/clinic - [phone]

## 2017-10-13 NOTE — PROGRESS NOTES
Was asked by surgical PA to see pt for family requesting diet information even though pt going on hospice  During visit there was no family present to identify their needs, will continue to monitor and see when family is available

## 2017-10-13 NOTE — SUBJECTIVE & OBJECTIVE
Subjective:  "I feel OK, my belly doesn't hurt today "  No nausea or vomiting  Consuming <50% a tray of surgical soft, but tolerating this with + BM  No complaints currently  Slightly confused about recent events  Objective:    Vitals:   Mvitals    Vitals:    10/12/17 2305 10/13/17 0006 10/13/17 0540 10/13/17 0700   BP: 117/57 117/57  116/65   Pulse: 70 70  66   Resp: 16   17   Temp: 98 2 °F (36 8 °C)   97 5 °F (36 4 °C)   TempSrc: Tympanic   Tympanic   SpO2: 96%   94%   Weight:   55 3 kg (121 lb 14 6 oz)    Height:         Vitals  Vitals:    10/13/17 0700   BP: 116/65   Pulse: 66   Resp: 17   Temp: 97 5 °F (36 4 °C)   SpO2: 94%         I/O:   Complete  I/O       10/11 0701 - 10/12 0700 10/12 0701 - 10/13 0700 10/13 0701 - 10/14 0700    P  O        I V  (mL/kg)       Total Intake(mL/kg)       Net                 Unmeasured Urine Occurrence 2 x 3 x           Labs:    Results from last 7 days  Lab Units 10/13/17  0454 10/10/17  0715 10/09/17  0620   WBC Thousand/uL 13 82* 10 87* 9 13   HEMOGLOBIN g/dL 10 9* 11 5 10 5*   HEMATOCRIT % 33 8* 35 8 34 1*   PLATELETS Thousands/uL 271 241 182               Results from last 7 days  Lab Units 10/13/17  0454 10/11/17  0609 10/10/17  0715 10/09/17  0620   SODIUM mmol/L 138 138 143 148*   POTASSIUM mmol/L 4 1 5 1 4 2 4 8   CHLORIDE mmol/L 107 111* 115* 120*   CO2 mmol/L 24 20* 20* 21   BUN mg/dL 19 13 12 13   CREATININE mg/dL 1 22 1 09 1 18 1 10   CALCIUM mg/dL 7 9* 8 3 8 1* 7 9*   TOTAL PROTEIN g/dL  --   --   --  4 9*   BILIRUBIN TOTAL mg/dL  --   --   --  0 21   ALK PHOS U/L  --   --   --  44*   ALT U/L  --   --   --  <6*   AST U/L  --   --   --  10   GLUCOSE RANDOM mg/dL 144* 119 112 148*       Exam:  General appearance: normal, fatigued, cooperative, cachectic and frail, elderly, AO to person, no complaints at the time of examiniation     Lungs: clear to auscultation bilaterally  Heart: regular rate and rhythm  Abdomen: nontender with no significant tenderness, rebound, or guarding, normal active bowel sounds  No rigidity, rebound, or guarding         Soniya Andres PA-C  Date: 10/13/2017 Time: 2:04 PM   Pager: [phone]

## 2017-10-13 NOTE — PLAN OF CARE
Problem: Potential for Falls  Goal: Patient will remain free of falls  INTERVENTIONS:  - Assess patient frequently for physical needs  -  Identify cognitive and physical deficits and behaviors that affect risk of falls    -  [place] fall precautions as indicated by assessment   - Educate patient/family on patient safety including physical limitations  - Instruct patient to call for assistance with activity based on assessment  - Modify environment to reduce risk of injury  - Consider OT/PT consult to assist with strengthening/mobility   Outcome: Progressing      Problem: Prexisting or High Potential for Compromised Skin Integrity  Goal: Skin integrity is maintained or improved  INTERVENTIONS:  - Identify patients at risk for skin breakdown  - Assess and monitor skin integrity  - Assess and monitor nutrition and hydration status  - Monitor labs (i e  albumin)  - Assess for incontinence   - Turn and reposition patient  - Assist with mobility/ambulation  - Relieve pressure over bony prominences  - Avoid friction and shearing  - Provide appropriate hygiene as needed including keeping skin clean and dry  - Evaluate need for skin moisturizer/barrier cream  - Collaborate with interdisciplinary team (i e  Nutrition, Rehabilitation, etc )   - Patient/family teaching   Outcome: Progressing      Problem: PAIN - ADULT  Goal: Verbalizes/displays adequate comfort level or baseline comfort level  Interventions:  - Encourage patient to monitor pain and request assistance  - Assess pain using appropriate pain scale  - Administer analgesics based on type and severity of pain and evaluate response  - Implement non-pharmacological measures as appropriate and evaluate response  - Consider cultural and social influences on pain and pain management  - Notify physician/advanced practitioner if interventions unsuccessful or patient reports new pain   Outcome: Progressing      Problem: INFECTION - ADULT  Goal: Absence or prevention of progression during hospitalization  INTERVENTIONS:  - Assess and monitor for signs and symptoms of infection  - Monitor lab/diagnostic results  - Monitor all insertion sites, i e  indwelling lines, tubes, and drains  - Monitor endotracheal (as able) and nasal secretions for changes in amount and color  - [place] appropriate cooling/warming therapies per order  - Administer medications as ordered  - Instruct and encourage patient and family to use good hand hygiene technique  - Identify and instruct in appropriate isolation precautions for identified infection/condition   Outcome: Progressing    Goal: Absence of fever/infection during neutropenic period  INTERVENTIONS:  - Monitor WBC  - Implement neutropenic guidelines   Outcome: Progressing      Problem: SAFETY ADULT  Goal: Maintain or return to baseline ADL function  INTERVENTIONS:  -  Assess patient's ability to carry out ADLs; assess patient's baseline for ADL function and identify physical deficits which impact ability to perform ADLs (bathing, care of mouth/teeth, toileting, grooming, dressing, etc )  - Assess/evaluate cause of self-care deficits   - Assess range of motion  - Assess patient's mobility; develop plan if impaired  - Assess patient's need for assistive devices and provide as appropriate  - Encourage maximum independence but intervene and supervise when necessary  ¯ Involve family in performance of ADLs  ¯ Assess for home care needs following discharge   ¯ Request OT consult to assist with ADL evaluation and planning for discharge  ¯ Provide patient education as appropriate   Outcome: Progressing    Goal: Maintain or return mobility status to optimal level  INTERVENTIONS:  - Assess patient's baseline mobility status (ambulation, transfers, stairs, etc )    - Identify cognitive and physical deficits and behaviors that affect mobility  - Identify mobility aids required to assist with transfers and/or ambulation (gait belt, sit-to-stand, lift, walker, cane, etc )  - [place] fall precautions as indicated by assessment  - Record patient progress and toleration of activity level on Mobility SBAR; progress patient to next Phase/Stage  - Instruct patient to call for assistance with activity based on assessment  - Request Rehabilitation consult to assist with strengthening/weightbearing, etc    Outcome: Progressing      Problem: DISCHARGE PLANNING  Goal: Discharge to home or other facility with appropriate resources  INTERVENTIONS:  - Identify barriers to discharge w/patient and caregiver  - Arrange for needed discharge resources and transportation as appropriate  - Identify discharge learning needs (meds, wound care, etc )  - Arrange for interpretive services to assist at discharge as needed  - Refer to Case Management Department for coordinating discharge planning if the patient needs post-hospital services based on physician/advanced practitioner order or complex needs related to functional status, cognitive ability, or social support system   Outcome: Progressing      Problem: Knowledge Deficit  Goal: Patient/family/caregiver demonstrates understanding of disease process, treatment plan, medications, and discharge instructions  Complete learning assessment and assess knowledge base    Interventions:  - Provide teaching at level of understanding  - Provide teaching via preferred learning methods   Outcome: Progressing      Problem: GASTROINTESTINAL - ADULT  Goal: Minimal or absence of nausea and/or vomiting  INTERVENTIONS:  - Administer IV fluids as ordered to ensure adequate hydration  - Maintain NPO status until nausea and vomiting are resolved  - Nasogastric tube as ordered  - Administer ordered antiemetic medications as needed  - Provide nonpharmacologic comfort measures as appropriate  - Advance diet as tolerated, if ordered  - Nutrition services referral to assist patient with adequate nutrition and appropriate food choices   Outcome: disorientation)  Collaborate with interdisciplinary team and initiate plan and interventions as ordered  Monitor patient's weight and dietary intake as ordered or per policy  Utilize nutrition screening tool and intervene per policy  Determine patient's food preferences and provide high-protein, high-caloric foods as appropriate       INTERVENTIONS:  - Monitor oral intake, urinary output, labs, and treatment plans  - Assess nutrition and hydration status and recommend course of action  - Evaluate amount of meals eaten  - Assist patient with eating if necessary   - Allow adequate time for meals  - Recommend/ encourage appropriate diets, oral nutritional supplements, and vitamin/mineral supplements  - Order, calculate, and assess calorie counts as needed  - Recommend, monitor, and adjust tube feedings and TPN/PPN based on assessed needs  - Assess need for intravenous fluids  - Provide specific nutrition/hydration education as appropriate  - Include patient/family/caregiver in decisions related to nutrition   Outcome: Progressing      Problem: SKIN/TISSUE INTEGRITY - ADULT  Goal: Skin integrity remains intact  INTERVENTIONS  - Identify patients at risk for skin breakdown  - Assess and monitor skin integrity  - Assess and monitor nutrition and hydration status  - Monitor labs (i e  albumin)  - Assess for incontinence   - Turn and reposition patient  - Assist with mobility/ambulation  - Relieve pressure over bony prominences  - Avoid friction and shearing  - Provide appropriate hygiene as needed including keeping skin clean and dry  - Evaluate need for skin moisturizer/barrier cream  - Collaborate with interdisciplinary team (i e  Nutrition, Rehabilitation, etc )   - Patient/family teaching   Outcome: Progressing

## 2017-10-14 VITALS
BODY MASS INDEX: 23.94 KG/M2 | TEMPERATURE: 98.6 F | DIASTOLIC BLOOD PRESSURE: 59 MMHG | SYSTOLIC BLOOD PRESSURE: 118 MMHG | HEIGHT: 60 IN | HEART RATE: 87 BPM | OXYGEN SATURATION: 97 % | WEIGHT: 121.91 LBS | RESPIRATION RATE: 17 BRPM

## 2017-10-14 RX ADMIN — TIMOLOL MALEATE 1 DROP: 5 SOLUTION/ DROPS OPHTHALMIC at 09:20

## 2017-10-14 RX ADMIN — FUROSEMIDE 20 MG: 20 TABLET ORAL at 09:19

## 2017-10-14 RX ADMIN — PANTOPRAZOLE SODIUM 40 MG: 40 TABLET, DELAYED RELEASE ORAL at 06:15

## 2017-10-14 RX ADMIN — VALSARTAN 320 MG: 160 TABLET ORAL at 09:19

## 2017-10-14 RX ADMIN — AMLODIPINE BESYLATE 2.5 MG: 2.5 TABLET ORAL at 09:19

## 2017-10-14 RX ADMIN — METOPROLOL TARTRATE 50 MG: 50 TABLET ORAL at 09:19

## 2017-10-14 NOTE — PROGRESS NOTES
Edith TATUM :  [facility]'s Hospice liaison, would  like [facility]'s  to call her when Owen Henry is ready to be discharged  to [place]  Will need to arrange for a hospital bed and other equipment for  Agueda's transition to hospice care  (see Josh Penny note)  Edith Goode can be reached @ her cell #    [phone]   If no answer, please call office #    [phone]

## 2017-10-14 NOTE — PLAN OF CARE
Problem: Potential for Falls  Goal: Patient will remain free of falls  INTERVENTIONS:  - Assess patient frequently for physical needs  -  Identify cognitive and physical deficits and behaviors that affect risk of falls    -  [place] fall precautions as indicated by assessment   - Educate patient/family on patient safety including physical limitations  - Instruct patient to call for assistance with activity based on assessment  - Modify environment to reduce risk of injury  - Consider OT/PT consult to assist with strengthening/mobility   Outcome: Adequate for Discharge      Problem: Prexisting or High Potential for Compromised Skin Integrity  Goal: Skin integrity is maintained or improved  INTERVENTIONS:  - Identify patients at risk for skin breakdown  - Assess and monitor skin integrity  - Assess and monitor nutrition and hydration status  - Monitor labs (i e  albumin)  - Assess for incontinence   - Turn and reposition patient  - Assist with mobility/ambulation  - Relieve pressure over bony prominences  - Avoid friction and shearing  - Provide appropriate hygiene as needed including keeping skin clean and dry  - Evaluate need for skin moisturizer/barrier cream  - Collaborate with interdisciplinary team (i e  Nutrition, Rehabilitation, etc )   - Patient/family teaching   Outcome: Adequate for Discharge      Problem: PAIN - ADULT  Goal: Verbalizes/displays adequate comfort level or baseline comfort level  Interventions:  - Encourage patient to monitor pain and request assistance  - Assess pain using appropriate pain scale  - Administer analgesics based on type and severity of pain and evaluate response  - Implement non-pharmacological measures as appropriate and evaluate response  - Consider cultural and social influences on pain and pain management  - Notify physician/advanced practitioner if interventions unsuccessful or patient reports new pain   Outcome: Adequate for Discharge      Problem: INFECTION - ADULT  Goal: Absence or prevention of progression during hospitalization  INTERVENTIONS:  - Assess and monitor for signs and symptoms of infection  - Monitor lab/diagnostic results  - Monitor all insertion sites, i e  indwelling lines, tubes, and drains  - Monitor endotracheal (as able) and nasal secretions for changes in amount and color  - [place] appropriate cooling/warming therapies per order  - Administer medications as ordered  - Instruct and encourage patient and family to use good hand hygiene technique  - Identify and instruct in appropriate isolation precautions for identified infection/condition   Outcome: Adequate for Discharge    Goal: Absence of fever/infection during neutropenic period  INTERVENTIONS:  - Monitor WBC  - Implement neutropenic guidelines   Outcome: Adequate for Discharge      Problem: SAFETY ADULT  Goal: Maintain or return to baseline ADL function  INTERVENTIONS:  -  Assess patient's ability to carry out ADLs; assess patient's baseline for ADL function and identify physical deficits which impact ability to perform ADLs (bathing, care of mouth/teeth, toileting, grooming, dressing, etc )  - Assess/evaluate cause of self-care deficits   - Assess range of motion  - Assess patient's mobility; develop plan if impaired  - Assess patient's need for assistive devices and provide as appropriate  - Encourage maximum independence but intervene and supervise when necessary  ¯ Involve family in performance of ADLs  ¯ Assess for home care needs following discharge   ¯ Request OT consult to assist with ADL evaluation and planning for discharge  ¯ Provide patient education as appropriate   Outcome: Adequate for Discharge    Goal: Maintain or return mobility status to optimal level  INTERVENTIONS:  - Assess patient's baseline mobility status (ambulation, transfers, stairs, etc )    - Identify cognitive and physical deficits and behaviors that affect mobility  - Identify mobility aids required to assist with transfers and/or ambulation (gait belt, sit-to-stand, lift, walker, cane, etc )  - [place] fall precautions as indicated by assessment  - Record patient progress and toleration of activity level on Mobility SBAR; progress patient to next Phase/Stage  - Instruct patient to call for assistance with activity based on assessment  - Request Rehabilitation consult to assist with strengthening/weightbearing, etc    Outcome: Adequate for Discharge      Problem: DISCHARGE PLANNING  Goal: Discharge to home or other facility with appropriate resources  INTERVENTIONS:  - Identify barriers to discharge w/patient and caregiver  - Arrange for needed discharge resources and transportation as appropriate  - Identify discharge learning needs (meds, wound care, etc )  - Arrange for interpretive services to assist at discharge as needed  - Refer to Case Management Department for coordinating discharge planning if the patient needs post-hospital services based on physician/advanced practitioner order or complex needs related to functional status, cognitive ability, or social support system   Outcome: Adequate for Discharge      Problem: Knowledge Deficit  Goal: Patient/family/caregiver demonstrates understanding of disease process, treatment plan, medications, and discharge instructions  Complete learning assessment and assess knowledge base    Interventions:  - Provide teaching at level of understanding  - Provide teaching via preferred learning methods   Outcome: Adequate for Discharge      Problem: GASTROINTESTINAL - ADULT  Goal: Minimal or absence of nausea and/or vomiting  INTERVENTIONS:  - Administer IV fluids as ordered to ensure adequate hydration  - Maintain NPO status until nausea and vomiting are resolved  - Nasogastric tube as ordered  - Administer ordered antiemetic medications as needed  - Provide nonpharmacologic comfort measures as appropriate  - Advance diet as tolerated, if ordered  - Nutrition services referral to assist patient with adequate nutrition and appropriate food choices   Outcome: Adequate for Discharge    Goal: Maintains or returns to baseline bowel function  INTERVENTIONS:  - Assess bowel function  - Encourage oral fluids to ensure adequate hydration  - Administer IV fluids as ordered to ensure adequate hydration  - Administer ordered medications as needed  - Encourage mobilization and activity  - Nutrition services referral to assist patient with appropriate food choices   Outcome: Adequate for Discharge    Goal: Maintains adequate nutritional intake  INTERVENTIONS:  - Monitor percentage of each meal consumed  - Identify factors contributing to decreased intake, treat as appropriate  - Assist with meals as needed  - Monitor I&O, WT and lab values  - Obtain nutrition services referral as needed   Outcome: Adequate for Discharge      Problem: DISCHARGE PLANNING - CARE MANAGEMENT  Goal: Discharge to post-acute care or home with appropriate resources  INTERVENTIONS:  - Conduct assessment to determine patient/family and health care team treatment goals, and need for post-acute services based on payer coverage, community resources, and patient preferences, and barriers to discharge  - Address psychosocial, clinical, and financial barriers to discharge as identified in assessment in conjunction with the patient/family and health care team  - Arrange appropriate level of post-acute services according to patient's   needs and preference and payer coverage in collaboration with the physician and health care team  - Communicate with and update the patient/family, physician, and health care team regarding progress on the discharge plan  - Arrange appropriate transportation to post-acute venues   Outcome: Adequate for Discharge      Problem: Nutrition/Hydration-ADULT  Goal: Nutrient/Hydration intake appropriate for improving, restoring or maintaining nutritional needs  Monitor and assess patient's nutrition/hydration status for malnutrition (ex- brittle hair, bruises, dry skin, pale skin and conjunctiva, muscle wasting, smooth red tongue, and disorientation)  Collaborate with interdisciplinary team and initiate plan and interventions as ordered  Monitor patient's weight and dietary intake as ordered or per policy  Utilize nutrition screening tool and intervene per policy  Determine patient's food preferences and provide high-protein, high-caloric foods as appropriate       INTERVENTIONS:  - Monitor oral intake, urinary output, labs, and treatment plans  - Assess nutrition and hydration status and recommend course of action  - Evaluate amount of meals eaten  - Assist patient with eating if necessary   - Allow adequate time for meals  - Recommend/ encourage appropriate diets, oral nutritional supplements, and vitamin/mineral supplements  - Order, calculate, and assess calorie counts as needed  - Recommend, monitor, and adjust tube feedings and TPN/PPN based on assessed needs  - Assess need for intravenous fluids  - Provide specific nutrition/hydration education as appropriate  - Include patient/family/caregiver in decisions related to nutrition   Outcome: Adequate for Discharge      Problem: SKIN/TISSUE INTEGRITY - ADULT  Goal: Skin integrity remains intact  INTERVENTIONS  - Identify patients at risk for skin breakdown  - Assess and monitor skin integrity  - Assess and monitor nutrition and hydration status  - Monitor labs (i e  albumin)  - Assess for incontinence   - Turn and reposition patient  - Assist with mobility/ambulation  - Relieve pressure over bony prominences  - Avoid friction and shearing  - Provide appropriate hygiene as needed including keeping skin clean and dry  - Evaluate need for skin moisturizer/barrier cream  - Collaborate with interdisciplinary team (i e  Nutrition, Rehabilitation, etc )   - Patient/family teaching   Outcome: Adequate for Discharge

## 2017-10-14 NOTE — CONSULTS
Consultation - Palliative and Supportive Care   Ambika Holley 80 y o  female [id number]    Patient Active Problem List   Diagnosis    Paralytic ileus [facility])    Essential hypertension    Platelet inhibition due to Plavix    Renal insufficiency    Hiatal hernia      Plan:  1  Symptom management - no symptoms at this time; pt will transition to comfort cares   - Opioids are available for pain   - Antipsychotics and ondansetron for nausea   - BZDs for anxiety PRN    2  Goals - comfort   - Pt competent, and well supported by garcia Rodriguez   - Pt would like to not return to hospital, nor leave her home at [address] contracts with this institution; referrals will be sent via CM  Code Status: DNR/DNI - Level 4   Power of :  presumed to be garcia Rodriguez by [place] 169   Advance Directive / Living Will: not reviewed   POLST:  none         We appreciate the invitation to be involved in this patient's care  We will be available by phone  Please do not hesitate to reach our on call provider through our clinic answering service at  should you have acute symptom control concerns  IDENTIFICATION:  Consults  Physician Requesting Consult: No att  providers found  Reason for Consult / Principal Problem: goals  Hx and PE limited by: none    HISTORY OF PRESENT ILLNESS:       Ambika Holley is a 80 y o  female who presents with SBO  Pt was admitted over a week ago with an SBO  She has no surgical history, no history of cancer, and no predisposing injuries to suggest a cause for this illness  Over the past week, she has been managed conservatively, until she pulled her own NG tube  After that point, roughly two days ago, she has demonstrated an ability to take PO, and has been passing gas  Her medical history is otherwise significant for CKD3, and htn  She has been treated for possible aspn pna with IV ABx this stay     At bedside today, pt states that she is much improved since her hospital admission, and she actually has no memory of what has happened prior to about three days ago  She is appreciative of the care provided, but she feels that going home to stay -- and not being subject to additional treatments -- would be most appropriate  "I'm 89, I think I've earned the right to be a little lazy "  She has flatly refused to participate in rehab, PT, OT, and is eating only as much as she feels inclined to do for pleasure  Additional details from discussion today recorded in separate note  Review of Systems   Constitution: Positive for decreased appetite, weakness and malaise/fatigue  Negative for fever  HENT: Negative for hoarse voice, nosebleeds and odynophagia  Eyes: Negative for discharge and double vision  Cardiovascular: Negative for chest pain and cyanosis  Respiratory: Negative for cough and shortness of breath  Endocrine: Negative for polydipsia, polyphagia and polyuria  Hematologic/Lymphatic: Negative for bleeding problem  Does not bruise/bleed easily  Skin: Positive for dry skin  Negative for flushing, itching and poor wound healing  Musculoskeletal: Positive for arthritis and back pain  Negative for falls and gout  Gastrointestinal: Positive for abdominal pain, anorexia, change in bowel habit and constipation  Negative for diarrhea and dysphagia  Genitourinary: Negative for dysuria, flank pain and frequency  Neurological: Negative for numbness, paresthesias and seizures  Psychiatric/Behavioral: Positive for memory loss  Negative for depression and hypervigilance  The patient does not have insomnia          Past Medical History:   Diagnosis Date    breast cancer     GERD (gastroesophageal reflux disease)     Glaucoma     Gout     Hyperlipidemia     Hypertension     Osteoarthritis     Stroke [facility])      Past Surgical History:   Procedure Laterality Date    ANKLE SURGERY      BREAST LUMPECTOMY Left     [id number]     Social History     Social History    Marital status: Single     Spouse name: N/A    Number of children: N/A    Years of education: N/A     Occupational History    Not on file  Social History Main Topics    Smoking status: Never Smoker    Smokeless tobacco: Never Used    Alcohol use Yes      Comment: rarely     Drug use: No    Sexual activity: Not on file     Other Topics Concern    Not on file     Social History Narrative    No narrative on file     History reviewed  No pertinent family history  MEDICATIONS / ALLERGIES:    all current active meds have been reviewed    No Known Allergies    OBJECTIVE:    Physical Exam  Physical Exam   Constitutional: She is oriented to person, place, and time  She appears well-developed  No distress  frail   HENT:   Head: Normocephalic and atraumatic  Right Ear: External ear normal    Left Ear: External ear normal    Mouth/Throat: No oropharyngeal exudate  Eyes: Conjunctivae and EOM are normal  Pupils are equal, round, and reactive to light  Right eye exhibits no discharge  Left eye exhibits no discharge  Neck: No tracheal deviation present  Cardiovascular:   tachycardic   Pulmonary/Chest: Effort normal  No stridor  No respiratory distress  Abdominal: Soft  She exhibits no distension  There is tenderness (diffuse)  There is no guarding  Scaphoid   Musculoskeletal: She exhibits no edema  Neurological: She is alert and oriented to person, place, and time  No cranial nerve deficit  Skin: Skin is warm and dry  No rash noted  She is not diaphoretic  No erythema  There is pallor  Psychiatric: She has a normal mood and affect  Her behavior is normal  Judgment and thought content normal        Lab Results: I have personally reviewed pertinent labs    Renal function does not indicate morphine would be safe  Imaging Studies: none new  EKG, Pathology, and Other Studies: none pertinent    Counseling / Coordination of Care  Total floor / unit time spent today 60+ minutes  Greater than 50% of total time was spent with the patient and / or family counseling and / or coordination of care  A description of the counseling / coordination of care: evaluation of decisional apparatus, extensive care coordinatoin at bedside with CM team and attending surgical service, discussion of case with family on thte floor

## 2017-10-14 NOTE — SOCIAL WORK
Pt is medically cleared Cm spoke with Shanda Wiseman who stated Lexie from [facility] is waiting for a phone call  Cm called Lexie from [facility] she is aware of the 430 pm  and will have a bed delivered  BLS with [facility] ARRANGED FOR 1630 for today

## 2017-10-14 NOTE — PROGRESS NOTES
Call placed to [facility]  Report given to [facility], nurse  Paperwork faxed to receiving facility  Awaiting transport at 1630

## 2017-10-14 NOTE — DISCHARGE SUMMARY
Discharge Summary - Santiago Granado 80 y o  female MRN: [id number]    Unit/Bed#: [facility] [address] 215-01 Encounter: [id number]    Admission Date: 10/4/2017     Admitting Diagnosis: Nausea [R11 0]  Small bowel obstruction [K56 609]  Hypertension [I10]  CKD (chronic kidney disease) [N18 9]  Chronic kidney disease, unspecified CKD stage [N18 9]  Hypertension, unspecified type [I10]  Intestinal obstruction, unspecified cause, unspecified whether partial or complete [K56 609]    HPI:  This is a pleasant 42-year-old female who was admitted to [facility] on October 4, 2017 diagnosis of paralytic ileus  Procedures Performed: No orders of the defined types were placed in this encounter  Hospital Course: Patient was inpatient at [facility] for approximately 12 days during which her paralytic ileus resolved patient was seen under the care of General surgery consultation to Internal Medicine  During the course of her admission and due to her general medical status the decision was made to consult palliative Medicine for consideration of hospice care  Following lengthy discussion between the treating teams decision made to discharge the patient into hospice care  On the day of discharge, patient was found to be clinically and hemodynamically stable  Significant Findings, Care, Treatment and Services Provided:  Multi disciplinary care including General surgery, Internal Medicine, and palliative Medicine teams  Complications:  None    Discharge Diagnosis:  Paralytic ileus    Condition at Discharge: stable     Discharge instructions/Information to patient and family:   See after visit summary for information provided to patient and family  Provisions for Follow-Up Care:  See after visit summary for information related to follow-up care and any pertinent home health orders  Disposition: See After Visit Summary for discharge disposition information      Planned Readmission: No    Discharge Statement   I spent 25 minutes discharging the patient  This time was spent on the day of discharge  I had direct contact with the patient on the day of discharge  Additional documentation is required if more than 30 minutes were spent on discharge  Discharge Medications:  See after visit summary for reconciled discharge medications provided to patient and family

## 2017-10-14 NOTE — NURSING NOTE
Patient left facility via transport team  Report was called to receiving facility as well as paperwork faxed  Family aware of transport

## 2018-01-18 NOTE — PROCEDURES
Procedures by GIOVANI Ridley at 11/3/2016  2:58 PM      Author:  GIOVANI Ridley Service:  (none) Author Type:  Speech and Language Pathologist     Filed:  11/3/2016  4:01 PM Date of Service:  11/3/2016  2:58 PM Status:  Signed     :  GIOVANI Ridley (Speech and Language Pathologist)         Pre-procedure Diagnoses:       1  Gastroesophageal reflux disease, esophagitis presence not specified [K21 9]       2  Pharyngoesophageal dysphagia [R13 14]       3  Feeding difficulties [R63 3]       4  Other general symptoms and signs [R68 89]                Procedures:       1  FL BARIUM Willetta Bidding SPEECH [VUB639 (Custom)]                                                      Video Swallow Study      Patient Name: Madalyn Mckeon  AZHDQ'Y Date: 11/3/2016     Pt is a 81 y/o female referred for video barium swallow with speech therapy on 11/3/2016 due to complaints of increased coughing during meals  MD wishing to rule out aspiration  PMH:  From recent office visit 6/8/2016: Active Problems  1  History of Acute lower UTI (599 0) (N39 0)  2  Adenocarcinoma of breast (174 9) (C50 919)  3  Anemia (285 9) (D64 9)  4  Cerumen impaction (380 4) (H61 20)  5  Chronic kidney disease (585 9) (N18 9)  6  Cough (786 2) (R05)  7  Dermatitis (692 9) (L30 9)  8  Difficulty walking (719 7) (R26 2)  9  Edema (782 3) (R60 9)  10  Esophageal reflux (530 81) (K21 9)  11  Glaucoma (365 9) (H40 9)  12  Gout (274 9) (M10 9)  13  History of stroke (V12 54) (Z86 73)  14  Hypercholesterolemia (272 0) (E78 0)  15  Hypertension (401 9) (I10)  16  History of Iron deficiency anemia secondary to inadequate dietary iron intake (280 1)  (D50 8)  17  Itch (698 9) (L29 9)  18  Multiple falls (V15 88) (R29 6)  19  Osteoarthritis (715 90) (M19 90)  20  Osteoporosis (733 00) (M81 0)  21  Overactive bladder (596 51) (N32 81)  22  Peripheral neuropathy (356 9) (G62 9)  23  Rectal prolapse (569 1) (K62 3)  24   Seroma (998 13) (T14 8)  25  Thrombophlebitis (451 9) (I80 9)  Past Medical History  1  History of Acute lower UTI (599 0) (N39 0)  2  History of Age At First Period 15Years Old (Menarche)  3  History of Age At First Pregnancy 23Years Old  4  History of Difficulty Breathing After Climbing ___ Steps Of Stairs  5  History of Difficulty in walking (719 7) (R26 2)  6  History Of 1 Previous Pregnancies (V61 5)  7  History of abnormal weight loss (V13 89) (Z87 898)  8  History of Bell's palsy (V12 49) (Z86 69)  9  History of constipation (V12 79) (Z87 19)  10  History of cyst of breast (V13 3) (Z87 2)  11  History of diarrhea (V12 79) (Z87 898)  12  History of shortness of breath (V13 89) (Z87 898)  13  History of stroke (V12 54) (Z86 73)  14  History of Iron deficiency anemia secondary to inadequate dietary iron intake (280 1)  (D50 8)  15  History of Legal blindness, as defined in [place] (369 4) (H54 8)  16  History of Muscle weakness (generalized) (728 87) (M62 81)  17  History of Previous Pregnancies Resulted In 1 Live Birth(S)  Surgical History  1  History of Biopsy Breast Percutaneous Needle Core  2  History of Breast Surgery Lumpectomy  · 05/09/14  3  History of Hysterectomy  · at [de-identified]years old  3  History of Rectal Surgery  · 2013, repair rectal prolapse  5  History of Treatment Of Ankle Fracture  · 1970's            Previous VBS: None     Current Diet: Regular textures, thin liquids  Pt states she avoids hard food, has difficulty biting into bread  She states her dentures are loose which makes it hard to chew some food items  Premorbid diet: Regular/thin     Dentition: Dentures top and bottom, loose fitting      O2 requirement: None    Oral mech: WFL  Strength and ROM: WFL of all articulators    Vocal Quality/Speech: WFL    Cognitive status: Appears WFL      Consistencies administered: Barium laden applesauce, cheerios/nectar, soft solid (rice krispie treat), hard solid (hard cookie), honey thick by tsp, nectar thick by cup and straw, thin liquids by cup and straw, 13mm barium pill  Pt was seated laterally at 90 degrees  Pt viewed in lateral and AP positions    Oral stage:  WFL-Mild  Lip closure: WFL  Mastication: Mildly prolonged with hard solids however breakdown was [facility]  Pt stated I learned to take longer to chew  Bolus formation: WFL and timely  Bolus control: WFL  Transfer: Mildly prolonged AP transfer with solids and liquids  Residue: None observed     Pharyngeal stage:  WFL-Mild  Swallow promptness: Prompt  Spill to valleculae: With solids and liquids  Spill to pyriforms: Not observed  Epiglottic inversion: Epiglottis was curved, touching base of tongue resulting in reduced epiglottic inversion during swallow  Laryngeal rise: Mild reduced   Pharyngeal constriction: WFL   Vallecular retention: Mild with solids, cleared with secondary swallow  Pill was caught in the vallecula and unable to clear with applesauce or liquid wash  Pt required multiple presentations of liquid and additional hard solids to eventually clear pill  from vallecula  Pt initially stated she did not feel the pill sticking, then later pointed to chest area stating I feel it in here  Pyriform retention: None observed   PPW coating: Mild with hard solid, cleared with secondary dry swallow  Osteophytes: Not observed  Transient penetration: Not observed  Epiglottic undercoat: Not observed   Penetration: None  Aspiration: None  Strategies: Neck flexion decreased epiglottic inversion resulting in increased vallecular retention  Pt benefits from secondary dry swallow and alternation of solids and liquids  Radiologist present, noted zenker's diverticulum that appeared calcified, not impacting swallow  Screening of Esophageal stage:    Asked radiologist to view film  Radiologist reported large hiatal hernia and zenker's diverticulum that appeared calcified (also noted on upper GI in 2008)  Zenker's did not appear to be impacting swallow     Retropulsion of food and liquids noted from hernia  Belching noted as session progressed  Pt may be at risk for bottom up aspiration without precautions  Summary: Pt presents with functional-mild oral and pharyngeal dysphagia as characterized by mildly prolonged mastication, mild premature spill to vallecula, vallecular retention with solids requiring secondary dry swallow/liquid wash to clear,  and reduced epiglottic inversion due to curved epiglottis  Pill stasis was observed in vallecula which was difficult to clear  No penetration/aspiration noted with thin liquids even with successive sips  As per radiologist pt presents with large hiatal  hernia and zenker's diverticulum (zenker's did not impact swallow)  Pt would benefit from reflux precautions  Recommendations:  Diet: Regular, choose softer foods as tolerated (pt states she already does this)  Liquids: Thin  Meds: Break or crush in puree as able  Strategies: Secondary dry swallow, alternation of solids and liquids, slow rate  F/u ST tx: Yes to ensure strategy use during meals  Intermittent Supervision  Aspiration Precautions  *Reflux Precautions*: Discussed upright posture during meals and for 60 minutes after eating, alternation of solids and liquids, slow rate of eating, elevating head of bed  Consider consult with: GI, dental consult  Results reviewed with: Pt and POA    If a dedicated assessment of the esophagus is desired, consider esophagram/barium swallow                          Received for:Provider  EPIC   Nov  3 2016  4:02PM [facility] Standard Time

## 2019-09-23 NOTE — MISCELLANEOUS
Provider Comments  Provider Comments:   Patient no showed for a 6 month breast ca follow up scheduled with Dr Carolina Sun for 12/7/2016  Left message on patient's voicemail to call our office to reschedule appointment        Signatures   Electronically signed by : ASIF Zendejas ; Dec  7 2016  2:20PM EST                       (Author)
Yes

## 2021-05-05 NOTE — PROGRESS NOTES
[facility] Progress Note  Patient: Aleshia Delarosa 80 y o  female   MRN: [id number]  PCP: Bonita Joyner,   Unit/Bed#: [facility] 223-01 Encounter: [id number]  Date Of Visit: 10/07/17      Assessment/plan  Principal problem  1  Small bowel obstruction- treating conservatively  Pt refused ngt  Possibly try frequent position changes  Await surgery follow up  Active problems  1  Possible aspiration pneumonia-continue cefepime and Flagyl day 3/7                    2  Hypertension- continue IV metoprolol      3  Chronic kidney disease 3- at baseline     4  GERD- continue ppi    dispo- pts daughter at bedside  She was asking how long do we continue treatment until we are certain she is not going to get better or is going to get better  She asked this due to pt not eating since at least Tuesday of last week  We discussed picc and tpn  Discussed comfort care  pts daughter wants to see if they can provide comfort care at the assisted living that pt was at  She wants to see how she does through the weekend  I also urged her to talk to the surgery team who is primary about things  Subjective:   Pt seen and examined  Pt is very weak per her daughter  She was able to answer some questions  She did have a small bm today and her granddaughter thought she heard her abd making gurgling noises  No other problems noted  Objective:     Vitals: Blood pressure 120/57, pulse 84, temperature 98 5 °F (36 9 °C), temperature source Tympanic, resp  rate 16, height 5' (1 524 m), weight 49 7 kg (109 lb 9 1 oz), SpO2 93 %  ,Body mass index is 21 4 kg/m²      Lab, Imaging and other studies:    Results from last 7 days  Lab Units 10/07/17  0537   WBC Thousand/uL 7 37   HEMOGLOBIN g/dL 11 2*   HEMATOCRIT % 35 5   PLATELETS Thousands/uL 201       Results from last 7 days  Lab Units 10/07/17  0537 10/05/17  0450   SODIUM mmol/L 142 140   POTASSIUM mmol/L 4 3 3 4*   CHLORIDE mmol/L 112* 105   CO2 mmol/L 19* 27   BUN mg/dL 20 Quite a year for her, glad she was boring in my area!  Thanks, Amrita 29*   CREATININE mg/dL 1 26 1 46*   CALCIUM mg/dL 7 5* 8 2*   TOTAL PROTEIN g/dL  --  6 0*   BILIRUBIN TOTAL mg/dL  --  0 35   ALK PHOS U/L  --  47   ALT U/L  --  9*   AST U/L  --  12   GLUCOSE RANDOM mg/dL 159* 153*         Lab Results   Component Value Date    URINECX Culture results to follow  09/07/2017    URINECX >100,000 cfu/ml Pseudomonas aeruginosa 09/07/2017    URINECX 50,000-59,000 cfu/ml Escherichia coli 09/07/2017     Physical exam:  Physical Exam  General appearance: awake and alert  Head: Normocephalic, without obvious abnormality, atraumatic  Eyes: conjunctivae/corneas clear  PERRL, EOM's intact  Fundi benign    Neck: no adenopathy, no carotid bruit, no JVD, supple, symmetrical, trachea midline and thyroid not enlarged, symmetric, no tenderness/mass/nodules  Lungs: clear to auscultation bilaterally  Heart: regular rate and rhythm, S1, S2 normal, no murmur, click, rub or gallop  Abdomen: soft nd nt decrease bowel sounds through out  Extremities: extremities normal, atraumatic, no cyanosis or edema  Pulses: 2+ and symmetric  Skin: Skin color, texture, turgor normal  No rashes or lesions  Neurologic: awake alert oriented x1 person      VTE Pharmacologic Prophylaxis: Heparin  VTE Mechanical Prophylaxis: sequential compression device    Counseling / Coordination of Care  Total floor / unit time spent today 20 minutes      Current Length of Stay: 2 day(s)    Current Patient Status: Inpatient       Code Status: No Order